# Patient Record
Sex: FEMALE | Race: WHITE | NOT HISPANIC OR LATINO | Employment: OTHER | URBAN - METROPOLITAN AREA
[De-identification: names, ages, dates, MRNs, and addresses within clinical notes are randomized per-mention and may not be internally consistent; named-entity substitution may affect disease eponyms.]

---

## 2020-01-15 ENCOUNTER — TELEPHONE (OUTPATIENT)
Dept: SCHEDULING | Facility: IMAGING CENTER | Age: 65
End: 2020-01-15

## 2020-01-15 DIAGNOSIS — N64.4 BREAST PAIN, RIGHT: ICD-10-CM

## 2020-01-22 ENCOUNTER — OFFICE VISIT (OUTPATIENT)
Dept: MEDICAL GROUP | Facility: PHYSICIAN GROUP | Age: 65
End: 2020-01-22
Payer: MEDICARE

## 2020-01-22 VITALS
BODY MASS INDEX: 35.84 KG/M2 | HEIGHT: 66 IN | OXYGEN SATURATION: 96 % | DIASTOLIC BLOOD PRESSURE: 80 MMHG | TEMPERATURE: 95.4 F | SYSTOLIC BLOOD PRESSURE: 126 MMHG | WEIGHT: 223 LBS | RESPIRATION RATE: 16 BRPM | HEART RATE: 98 BPM

## 2020-01-22 DIAGNOSIS — E78.2 MIXED HYPERLIPIDEMIA: ICD-10-CM

## 2020-01-22 DIAGNOSIS — R10.2 CHRONIC PELVIC PAIN IN FEMALE: ICD-10-CM

## 2020-01-22 DIAGNOSIS — N30.90 RECURRENT CYSTITIS: ICD-10-CM

## 2020-01-22 DIAGNOSIS — G89.29 CHRONIC PELVIC PAIN IN FEMALE: ICD-10-CM

## 2020-01-22 DIAGNOSIS — F43.10 PTSD (POST-TRAUMATIC STRESS DISORDER): ICD-10-CM

## 2020-01-22 PROBLEM — R10.31 CHRONIC GROIN PAIN, RIGHT: Status: ACTIVE | Noted: 2020-01-22

## 2020-01-22 PROCEDURE — 99204 OFFICE O/P NEW MOD 45 MIN: CPT | Performed by: NURSE PRACTITIONER

## 2020-01-22 RX ORDER — ALPRAZOLAM 0.5 MG/1
0.5 TABLET ORAL
COMMUNITY
Start: 2020-01-17 | End: 2020-02-16

## 2020-01-22 RX ORDER — BUSPIRONE HYDROCHLORIDE 15 MG/1
15 TABLET ORAL
COMMUNITY
Start: 2020-01-09 | End: 2020-04-08

## 2020-01-22 RX ORDER — BUSPIRONE HYDROCHLORIDE 30 MG/1
45 TABLET ORAL
COMMUNITY
Start: 2020-01-09 | End: 2020-04-08

## 2020-01-22 RX ORDER — GINSENG 100 MG
CAPSULE ORAL
COMMUNITY

## 2020-01-22 RX ORDER — TRAZODONE HYDROCHLORIDE 150 MG/1
150 TABLET ORAL
COMMUNITY
Start: 2020-01-02 | End: 2020-02-01

## 2020-01-22 RX ORDER — NITROGLYCERIN 0.4 MG/1
0.4 TABLET SUBLINGUAL
COMMUNITY
Start: 2019-09-17 | End: 2023-03-15

## 2020-01-22 RX ORDER — HYDROMORPHONE HYDROCHLORIDE 2 MG/1
2 TABLET ORAL
COMMUNITY
Start: 2015-09-05 | End: 2021-08-30

## 2020-01-22 RX ORDER — ACETAMINOPHEN 160 MG
TABLET,DISINTEGRATING ORAL
COMMUNITY
End: 2021-08-30

## 2020-01-22 RX ORDER — MULTIVITAMIN WITH IRON
250 TABLET ORAL
COMMUNITY
Start: 2015-02-25

## 2020-01-22 SDOH — HEALTH STABILITY: MENTAL HEALTH: HOW MANY STANDARD DRINKS CONTAINING ALCOHOL DO YOU HAVE ON A TYPICAL DAY?: 1 OR 2

## 2020-01-22 SDOH — HEALTH STABILITY: MENTAL HEALTH: HOW OFTEN DO YOU HAVE A DRINK CONTAINING ALCOHOL?: 2-3 TIMES A WEEK

## 2020-01-22 ASSESSMENT — ENCOUNTER SYMPTOMS
CHILLS: 0
SHORTNESS OF BREATH: 0
MYALGIAS: 1
NERVOUS/ANXIOUS: 1
ABDOMINAL PAIN: 0
FEVER: 0
DEPRESSION: 1
TINGLING: 1
BLOOD IN STOOL: 0
PALPITATIONS: 0

## 2020-01-22 ASSESSMENT — PATIENT HEALTH QUESTIONNAIRE - PHQ9: CLINICAL INTERPRETATION OF PHQ2 SCORE: 0

## 2020-01-22 NOTE — LETTER
[facility]  PARMINDER Albright  [address] [facility] [address]  [facility] [address]  Fax: [phone]   Authorization for Release/Disclosure of   Protected Health Information   Name: JULIANA BAILEY : 1955 SSN: [id number]   Address: [address] [place] [address] Phone:    [phone] (home)    I authorize the entity listed below to release/disclose the PHI below to:   [facility]/PARMINDER Albright and PARMINDER Albright   Provider or Entity Name:     Address   City, State, Zip   Phone:      Fax:     Reason for request: continuity of care   Information to be released:    [  ] LAST COLONOSCOPY,  including any PATH REPORT and follow-up  [  ] LAST FIT/COLOGUARD RESULT [  ] LAST DEXA  [  ] LAST MAMMOGRAM  [  ] LAST PAP  [  ] LAST LABS [  ] RETINA EXAM REPORT  [  ] IMMUNIZATION RECORDS  [  ] Release all info      [  ] Check here and initial the line next to each item to release ALL health information INCLUDING  _____ Care and treatment for drug and / or alcohol abuse  _____ HIV testing, infection status, or AIDS  _____ Genetic Testing    DATES OF SERVICE OR TIME PERIOD TO BE DISCLOSED: _____________  I understand and acknowledge that:  * This Authorization may be revoked at any time by you in writing, except if your health information has already been used or disclosed.  * Your health information that will be used or disclosed as a result of you signing this authorization could be re-disclosed by the recipient. If this occurs, your re-disclosed health information may no longer be protected by State or Federal laws.  * You may refuse to sign this Authorization. Your refusal will not affect your ability to obtain treatment.  * This Authorization becomes effective upon signing and will  on (date) __________.      If no date is indicated, this Authorization will  one (1) year from the signature date.    Name: Juliana Bailey    Signature:   Date:     2020            PLEASE FAX REQUESTED RECORDS BACK TO: [phone]
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
## 2020-01-22 NOTE — ASSESSMENT & PLAN NOTE
This is a chronic issue that began after insertion of mesh for urinary incontinence in 2005 and 2011. She did have mesh removed in 2015. Pain is located R groin area. She also experiences recurrent cystitis which are treated with chronic antibiotics. She has been treated with Dilaudid 2mg daily and flexeril prn which was prescribed by pain specialist.  She is also followed by [facility] specialist.

## 2020-01-22 NOTE — ASSESSMENT & PLAN NOTE
This is a chronic issue. She is taking atorvastatin 10mg daily. She is unsure when last lipid panel was drawn. She is a current smoker for 40+ year history of 0.5 ppd.
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
## 2020-01-22 NOTE — ASSESSMENT & PLAN NOTE
This has been an on-going issue for the past several years. Her symptoms have been treated with chronic antibiotic therapy. She was previously followed by urology, however hasn't established with specialist in area.

## 2020-01-29 ENCOUNTER — TELEPHONE (OUTPATIENT)
Dept: MEDICAL GROUP | Facility: PHYSICIAN GROUP | Age: 65
End: 2020-01-29

## 2020-01-29 DIAGNOSIS — D17.9 INTRAMUSCULAR LIPOMA: ICD-10-CM

## 2020-01-29 NOTE — TELEPHONE ENCOUNTER
The patient was seen by Dr. Lopez, records within [facility]. She was diagnosed with a arm mass. She is needing to see a surgeon for this. She declined the [facility] clinic suggestion.

## 2020-02-21 ENCOUNTER — TELEPHONE (OUTPATIENT)
Dept: MEDICAL GROUP | Facility: PHYSICIAN GROUP | Age: 65
End: 2020-02-21

## 2020-02-21 NOTE — TELEPHONE ENCOUNTER
Patient called in requiring about a urology referral sent. She hasn't heard anything from them. Per [facility], her referral has been received and the new patient coordinator will contact her to schedule. Patient was informed. Information for their clinic was given to her.

## 2020-02-25 ENCOUNTER — HOSPITAL ENCOUNTER (OUTPATIENT)
Facility: MEDICAL CENTER | Age: 65
End: 2020-02-25
Attending: NURSE PRACTITIONER
Payer: MEDICARE

## 2020-02-25 ENCOUNTER — OFFICE VISIT (OUTPATIENT)
Dept: MEDICAL GROUP | Facility: PHYSICIAN GROUP | Age: 65
End: 2020-02-25
Payer: MEDICARE

## 2020-02-25 VITALS
RESPIRATION RATE: 16 BRPM | DIASTOLIC BLOOD PRESSURE: 74 MMHG | HEIGHT: 66 IN | TEMPERATURE: 97.6 F | BODY MASS INDEX: 36.26 KG/M2 | WEIGHT: 225.6 LBS | SYSTOLIC BLOOD PRESSURE: 140 MMHG | OXYGEN SATURATION: 95 % | HEART RATE: 89 BPM

## 2020-02-25 DIAGNOSIS — N30.90 RECURRENT CYSTITIS: ICD-10-CM

## 2020-02-25 LAB
APPEARANCE UR: CLEAR
BILIRUB UR STRIP-MCNC: NORMAL MG/DL
COLOR UR AUTO: YELLOW
GLUCOSE UR STRIP.AUTO-MCNC: NORMAL MG/DL
KETONES UR STRIP.AUTO-MCNC: NORMAL MG/DL
LEUKOCYTE ESTERASE UR QL STRIP.AUTO: NORMAL
NITRITE UR QL STRIP.AUTO: NORMAL
PH UR STRIP.AUTO: 6 [PH] (ref 5–8)
PROT UR QL STRIP: NORMAL MG/DL
RBC UR QL AUTO: NORMAL
SP GR UR STRIP.AUTO: 1.02
UROBILINOGEN UR STRIP-MCNC: 0.2 MG/DL

## 2020-02-25 PROCEDURE — 81002 URINALYSIS NONAUTO W/O SCOPE: CPT | Performed by: NURSE PRACTITIONER

## 2020-02-25 PROCEDURE — 87086 URINE CULTURE/COLONY COUNT: CPT

## 2020-02-25 PROCEDURE — 99213 OFFICE O/P EST LOW 20 MIN: CPT | Performed by: NURSE PRACTITIONER

## 2020-02-25 RX ORDER — ALPRAZOLAM 0.5 MG/1
0.5 TABLET ORAL PRN
COMMUNITY
End: 2021-08-18

## 2020-02-25 RX ORDER — TRAZODONE HYDROCHLORIDE 50 MG/1
TABLET ORAL NIGHTLY PRN
Qty: 30 TAB | Refills: 0 | Status: CANCELLED | OUTPATIENT
Start: 2020-02-25

## 2020-02-25 RX ORDER — TRAZODONE HYDROCHLORIDE 150 MG/1
150 TABLET ORAL NIGHTLY PRN
Qty: 30 TAB | Refills: 0 | Status: SHIPPED | OUTPATIENT
Start: 2020-02-25 | End: 2021-08-30

## 2020-02-25 RX ORDER — TRAZODONE HYDROCHLORIDE 150 MG/1
150 TABLET ORAL
COMMUNITY
Start: 2020-01-23 | End: 2020-02-25 | Stop reason: SDUPTHER

## 2020-02-25 ASSESSMENT — ENCOUNTER SYMPTOMS
FEVER: 0
CHILLS: 0
FLANK PAIN: 0

## 2020-02-25 NOTE — LETTER
[facility]  PARMINDER Albright  [address] [facility] [address]  [facility] [address]  Fax: [phone]   Authorization for Release/Disclosure of   Protected Health Information   Name: JULIANA BAILEY : 1955 SSN: [id number]   Address: [address] [place] [address] Phone:    [phone] (home)    I authorize the entity listed below to release/disclose the PHI below to:   [facility]/PARMINDER Albright and PARMINDER Albright   Provider or Entity Name:  Primary Care   Address   City, State, [facility] Phone:      Fax:     Reason for request: continuity of care   Information to be released:    [  ] LAST COLONOSCOPY,  including any PATH REPORT and follow-up  [  ] LAST FIT/COLOGUARD RESULT [  ] LAST DEXA  [  ] LAST MAMMOGRAM  [  ] LAST PAP  [  ] LAST LABS [  ] RETINA EXAM REPORT  [  ] IMMUNIZATION RECORDS  [  ] Release all info      [  ] Check here and initial the line next to each item to release ALL health information INCLUDING  _____ Care and treatment for drug and / or alcohol abuse  _____ HIV testing, infection status, or AIDS  _____ Genetic Testing    DATES OF SERVICE OR TIME PERIOD TO BE DISCLOSED: _____________  I understand and acknowledge that:  * This Authorization may be revoked at any time by you in writing, except if your health information has already been used or disclosed.  * Your health information that will be used or disclosed as a result of you signing this authorization could be re-disclosed by the recipient. If this occurs, your re-disclosed health information may no longer be protected by State or Federal laws.  * You may refuse to sign this Authorization. Your refusal will not affect your ability to obtain treatment.  * This Authorization becomes effective upon signing and will  on (date) __________.      If no date is indicated, this Authorization will  one (1) year from the signature date.    Name: Juliana Bailey    Signature:   Date:          2/25/2020       PLEASE FAX REQUESTED RECORDS BACK TO: [phone]

## 2020-02-25 NOTE — PROGRESS NOTES
Subjective:   Veronica Reeves is a 65 y.o. female here today for concerns of UTI.     Dysuria    This is a new problem. Episode onset: 2 days ago. The problem occurs intermittently. The problem has been unchanged. The quality of the pain is described as burning. The pain is mild. There has been no fever. Associated symptoms include frequency and urgency. Pertinent negatives include no chills, flank pain, hematuria or hesitancy. She has tried increased fluids for the symptoms. Her past medical history is significant for recurrent UTIs.       Current medicines (including changes today)  Current Outpatient Medications   Medication Sig Dispense Refill   • TRAZODONE HCL PO Take  by mouth at bedtime as needed. Pt to let us know MG     • ALPRAZolam (XANAX) 0.5 MG Tab Take 0.5 mg by mouth as needed for Sleep.     • busPIRone (BUSPAR) 30 MG tablet Take 45 mg by mouth.     • busPIRone (BUSPAR) 15 MG tablet Take 15 mg by mouth.     • Cholecalciferol (VITAMIN D3) 2000 UNIT Cap by Other route.     • Cranberry 200 MG Cap Take  by mouth.     • HYDROmorphone (DILAUDID) 2 MG Tab Take 2 mg by mouth.     • Magnesium (ESSENTIAL MAGNESIUM) 250 MG Tab Take 250 mg by mouth.     • nitroglycerin (NITROSTAT) 0.4 MG SL Tab Place 0.4 mg under tongue.     • duloxetine (CYMBALTA) 60 MG CPEP delayed-release capsule Take 60 mg by mouth every day.     • atorvastatin (LIPITOR) 10 MG TABS Take 10 mg by mouth every evening.     • cyclobenzaprine (FLEXERIL) 10 MG TABS Take 10 mg by mouth 3 times a day as needed.       No current facility-administered medications for this visit.      She  has a past medical history of Cancer of breast (HCC), Cervical cancer (HCC), Chronic pelvic pain in female, Encounter for long-term (current) use of other medications, Migraine, Neck injury, RA (rheumatoid arthritis) (HCC), and Recurrent cystitis.    Social History     Socioeconomic History   • Marital status:      Spouse name: Not on file   • Number of children:  "Not on file   • Years of education: Not on file   • Highest education level: Not on file   Occupational History     Comment: Disabled   Social Needs   • Financial resource strain: Not on file   • Food insecurity     Worry: Not on file     Inability: Not on file   • Transportation needs     Medical: Not on file     Non-medical: Not on file   Tobacco Use   • Smoking status: Current Every Day Smoker     Packs/day: 0.50     Years: 40.00     Pack years: 20.00     Types: Cigarettes   • Smokeless tobacco: Never Used   • Tobacco comment: 4 cigarettes qd   Substance and Sexual Activity   • Alcohol use: Yes     Frequency: 2-3 times a week     Drinks per session: 1 or 2   • Drug use: Yes     Types: Marijuana, Inhaled     Comment: cbd cream   • Sexual activity: Not Currently   Lifestyle   • Physical activity     Days per week: Not on file     Minutes per session: Not on file   • Stress: Not on file   Relationships   • Social connections     Talks on phone: Not on file     Gets together: Not on file     Attends Christian service: Not on file     Active member of club or organization: Not on file     Attends meetings of clubs or organizations: Not on file     Relationship status: Not on file   • Intimate partner violence     Fear of current or ex partner: Not on file     Emotionally abused: Not on file     Physically abused: Not on file     Forced sexual activity: Not on file   Other Topics Concern   • Not on file   Social History Narrative   • Not on file       Review of Systems   Constitutional: Negative for chills and fever.   Genitourinary: Positive for dysuria, frequency and urgency. Negative for flank pain, hematuria and hesitancy.        Objective:     /74 (BP Location: Left arm, Patient Position: Sitting, BP Cuff Size: Adult)   Pulse 89   Temp 36.4 °C (97.6 °F) (Temporal)   Resp 16   Ht 1.676 m (5' 6\")   Wt 102.3 kg (225 lb 9.6 oz)   SpO2 95%  Body mass index is 36.41 kg/m².     Physical Exam:  Constitutional: " Oriented to person, place, and time and well-developed, well-nourished, and in no distress.   HENT:   Head: Normocephalic and atraumatic.   Eyes: Conjunctivae and EOM are normal. Pupils are equal, round, and reactive to light.   Neck: Normal range of motion. Neck supple.   Cardiovascular: Normal rate, regular rhythm, normal heart sounds.Exam reveals no friction rub. No murmur heard.  Pulmonary/Chest: Effort normal and breath sounds normal. No respiratory distress or use of accessory muscles. No wheezes, rhonchi, or rales.   Neurological: Alert and oriented to person, place, and time.   Psychiatric: Mood, memory, affect and judgment normal.       Assessment and Plan:   The following treatment plan was discussed    1. Recurrent cystitis  U/A normal. Urine sent for culture. Continue to maintain adequate daily water intake and practice good feminine hygiene practices.  - POCT Urinalysis  - URINE CULTURE(NEW); Future      Followup: Return if symptoms worsen or fail to improve.

## 2020-02-26 NOTE — TELEPHONE ENCOUNTER
Received request via: Patient    Was the patient seen in the last year in this department? Yes    Does the patient have an active prescription (recently filled or refills available) for medication(s) requested? yes

## 2020-02-28 LAB
BACTERIA UR CULT: NORMAL
SIGNIFICANT IND 70042: NORMAL
SITE SITE: NORMAL
SOURCE SOURCE: NORMAL

## 2020-03-11 ENCOUNTER — OFFICE VISIT (OUTPATIENT)
Dept: MEDICAL GROUP | Facility: PHYSICIAN GROUP | Age: 65
End: 2020-03-11
Payer: MEDICARE

## 2020-03-11 VITALS
HEART RATE: 84 BPM | TEMPERATURE: 98.5 F | BODY MASS INDEX: 36.16 KG/M2 | HEIGHT: 66 IN | RESPIRATION RATE: 16 BRPM | WEIGHT: 225 LBS | DIASTOLIC BLOOD PRESSURE: 80 MMHG | OXYGEN SATURATION: 97 % | SYSTOLIC BLOOD PRESSURE: 134 MMHG

## 2020-03-11 DIAGNOSIS — N64.4 BREAST PAIN, RIGHT: ICD-10-CM

## 2020-03-11 PROCEDURE — 99213 OFFICE O/P EST LOW 20 MIN: CPT | Performed by: PHYSICIAN ASSISTANT

## 2020-03-11 NOTE — PROGRESS NOTES
CC:  Chief Complaint   Patient presents with   • Arm Pain     Pain under right arm pit x 1 week        HISTORY OF PRESENT ILLNESS: Patient is a 65 y.o. female established patient presenting wit pain in her R axilla for the past days. It started itching in this region for the past several months. She has a hx of breast CA with biopsy in her R breast. She is not noticing any swelling or lumps in her axilla or breast but it is tender to touch. Pt had double mastectomy with breast reconstruction. She no longer gets regular mammogram screenings.       No problem-specific Assessment & Plan notes found for this encounter.      Patient Active Problem List    Diagnosis Date Noted   • Recurrent cystitis 01/22/2020   • Chronic pelvic pain in female 01/22/2020   • PTSD (post-traumatic stress disorder) 01/22/2020   • Mixed hyperlipidemia 01/22/2020      Allergies:Ciprofloxacin; Hydrocodone; and Percocet [oxycodone-acetaminophen]    Current Outpatient Medications   Medication Sig Dispense Refill   • TRAZODONE HCL PO Take  by mouth at bedtime as needed. Pt to let us know MG     • ALPRAZolam (XANAX) 0.5 MG Tab Take 0.5 mg by mouth as needed for Sleep.     • Cholecalciferol (VITAMIN D3) 2000 UNIT Cap by Other route.     • Cranberry 200 MG Cap Take  by mouth.     • HYDROmorphone (DILAUDID) 2 MG Tab Take 2 mg by mouth.     • Magnesium (ESSENTIAL MAGNESIUM) 250 MG Tab Take 250 mg by mouth.     • nitroglycerin (NITROSTAT) 0.4 MG SL Tab Place 0.4 mg under tongue.     • duloxetine (CYMBALTA) 60 MG CPEP delayed-release capsule Take 60 mg by mouth every day.     • atorvastatin (LIPITOR) 10 MG TABS Take 10 mg by mouth every evening.     • traZODone (DESYREL) 150 MG Tab Take 1 Tab by mouth at bedtime as needed for Sleep. 30 Tab 0   • busPIRone (BUSPAR) 30 MG tablet Take 45 mg by mouth.     • busPIRone (BUSPAR) 15 MG tablet Take 15 mg by mouth.     • cyclobenzaprine (FLEXERIL) 10 MG TABS Take 10 mg by mouth 3 times a day as needed.       No  "current facility-administered medications for this visit.        Social History     Tobacco Use   • Smoking status: Current Every Day Smoker     Packs/day: 0.50     Years: 40.00     Pack years: 20.00     Types: Cigarettes   • Smokeless tobacco: Never Used   • Tobacco comment: 4 cigarettes qd   Substance Use Topics   • Alcohol use: Yes     Frequency: 2-3 times a week     Drinks per session: 1 or 2   • Drug use: Yes     Types: Marijuana, Inhaled     Comment: cbd cream     Social History     Social History Narrative   • Not on file       Family History   Problem Relation Age of Onset   • No Known Problems Mother    • Stroke Father    • Autoimmune Disease Father         Crohn   • No Known Problems Sister    • No Known Problems Brother    • Mult Sclerosis Daughter    • Diabetes Daughter         ROS:     - Constitutional:  Negative for fever, chills, unexpected weight change, and fatigue/generalized weakness.    - HEENT:  Negative for headaches, vision changes, hearing changes, ear pain, ear discharge, rhinorrhea, sinus congestion, sore throat, and neck pain.    Breast: Positive for R breast pain and tenderness, negative for swelling, skin changes.     - Respiratory: Negative for cough, sputum production, chest congestion, dyspnea, wheezing, and crackles.      - Cardiovascular: Negative for chest pain, palpitations, orthopnea, and bilateral lower extremity edema.            - NOTE: All other systems reviewed and are negative, except as in HPI.      Exam:    /80 (BP Location: Left arm, Patient Position: Sitting, BP Cuff Size: Adult)   Pulse 84   Temp 36.9 °C (98.5 °F) (Temporal)   Resp 16   Ht 1.676 m (5' 6\")   Wt 102.1 kg (225 lb)   SpO2 97%  Body mass index is 36.32 kg/m².    General:  Well nourished, well developed female in NAD  Head is grossly normal.  Neck: Supple. Thyroid is not enlarged.  Pulmonary: Clear to ausculation and percussion.  Normal effort. No rales, ronchi, or wheezing.  Cardiovascular: " Regular rate and rhythm without murmur. Carotid and radial pulses are intact and equal bilaterally.  Breast: R breast has significant point tenderness in R upper outer breast. Positive for old surgical scars from mastectomy. No skin changes, no swelling or lumps. Chaperone present in room during exam.  Extremities: no clubbing, cyanosis, or edema.    Please note that this dictation was created using voice recognition software. I have made every reasonable attempt to correct obvious errors, but I expect that there are errors of grammar and possibly content that I did not discover before finalizing the note.      Assessment/Plan:    1. Breast pain, right  We will having Breast US done to characterize area of tenderness. Consider possible oncology referral in future if needed. She has a f/u appt scheduled with her PCP Desire Kemp in 2 weeks.   - US-SCREENING WHOLE BREAST (3D SCREENING); Future

## 2020-03-13 ENCOUNTER — HOSPITAL ENCOUNTER (OUTPATIENT)
Dept: RADIOLOGY | Facility: MEDICAL CENTER | Age: 65
End: 2020-03-13
Payer: MEDICARE

## 2020-03-13 NOTE — TELEPHONE ENCOUNTER
I spoke with pt that imaging is requiring her to have mammo with US done instead of just mammo. Likely a waste of time and resources but we have to do it this way.

## 2020-03-17 ENCOUNTER — HOSPITAL ENCOUNTER (OUTPATIENT)
Dept: RADIOLOGY | Facility: MEDICAL CENTER | Age: 65
End: 2020-03-17
Attending: PHYSICIAN ASSISTANT
Payer: MEDICARE

## 2020-03-17 DIAGNOSIS — N64.4 BREAST PAIN, RIGHT: ICD-10-CM

## 2020-03-17 PROCEDURE — 76642 ULTRASOUND BREAST LIMITED: CPT | Mod: RT

## 2020-03-17 PROCEDURE — G0279 TOMOSYNTHESIS, MAMMO: HCPCS

## 2020-03-20 RX ORDER — ATORVASTATIN CALCIUM 10 MG/1
10 TABLET, FILM COATED ORAL DAILY
Qty: 30 TAB | Refills: 1 | Status: SHIPPED
Start: 2020-03-20 | End: 2020-04-06

## 2020-03-22 ENCOUNTER — TELEPHONE (OUTPATIENT)
Dept: URGENT CARE | Facility: CLINIC | Age: 65
End: 2020-03-22

## 2020-03-22 NOTE — TELEPHONE ENCOUNTER
Called patient to reschedule appointment but her voicemail is not set up. If she calls back please reschedule.

## 2020-03-25 ENCOUNTER — TELEPHONE (OUTPATIENT)
Dept: MEDICAL GROUP | Facility: PHYSICIAN GROUP | Age: 65
End: 2020-03-25

## 2020-03-25 DIAGNOSIS — Z85.3 HISTORY OF BREAST CANCER: ICD-10-CM

## 2020-03-25 NOTE — TELEPHONE ENCOUNTER
1. Caller Name: Veronica Reeves     Call Back Number: [phone] (home)        How would the patient prefer to be contacted with a response: Phone call OK to leave a detailed message    2. SPECIFIC Action To Be Taken: wants referral to Oncology    3. Diagnosis/Clinical Reason for Request: Pain in the right breast; previous history of breast cancer    4. Specialty & Provider Name/Lab/Imaging Location: N/A    5. Is appointment scheduled for requested order/referral: no    Patient was not informed they will receive a return phone call from the office ONLY if there are any questions before processing their request. Advised to call back if they haven't received a call from the referral department in 5 days.

## 2020-03-30 ENCOUNTER — APPOINTMENT (OUTPATIENT)
Dept: MEDICAL GROUP | Facility: PHYSICIAN GROUP | Age: 65
End: 2020-03-30
Payer: MEDICARE

## 2020-03-31 ENCOUNTER — OFFICE VISIT (OUTPATIENT)
Dept: MEDICAL GROUP | Facility: PHYSICIAN GROUP | Age: 65
End: 2020-03-31
Payer: MEDICARE

## 2020-03-31 DIAGNOSIS — M79.10 MUSCLE PAIN: ICD-10-CM

## 2020-03-31 DIAGNOSIS — Z13.1 SCREENING FOR DIABETES MELLITUS: ICD-10-CM

## 2020-03-31 DIAGNOSIS — E78.2 MIXED HYPERLIPIDEMIA: ICD-10-CM

## 2020-03-31 DIAGNOSIS — R42 DIZZINESS: ICD-10-CM

## 2020-03-31 DIAGNOSIS — Z13.29 SCREENING FOR THYROID DISORDER: ICD-10-CM

## 2020-03-31 PROCEDURE — 99442 PR PHYSICIAN TELEPHONE EVALUATION 11-20 MIN: CPT | Performed by: NURSE PRACTITIONER

## 2020-03-31 NOTE — PROGRESS NOTES
"           Telephone Appointment Visit   As a means of avoiding spread of COVID-19, this visit is being conducted by telephone. This telephone visit was initiated by the patient and they verbally consented.    Time at start of call: 1:49pm    Reason for Call:  New Symptom/ Concern: Dizziness    Patient Comments / History:   Patient reports new onset of dizziness that began one week ago. Describes as feeling \"uneasy and lightheaded.\" Associated symptoms include fatigue. She denies heart palpitations, headaches, cough, or SOB. She denies syncope or LOC. She tried taking OTC Dramamine which did not relief her symptoms. She does have hx of hyperlipidemia. Reports drinking at least 2 L of water daily. Her last BP reading was 138/80 on 3/11/20. Her last labs were drawn over one year ago. She also notes musculoskeletal pain located substernal region. Her symptoms were evaluated by pain specialist one week ago who suggested that she perform daily stretching to pectoral region; she reports no relief.     Labs / Images Reviewed   No current labs to review. She did have U/S of breast performed on 3/17/20 which was normal.    Assessment and Plan:     1. Dizziness  Labs ordered to r/o underlying etiology. Advised that she continue to monitor symptoms and notify clinic if symptoms worsen. Labs faxed to [facility].  - TSH; Future  - FREE THYROXINE; Future  - HEMOGLOBIN A1C; Future  - Comp Metabolic Panel; Future  - CBC WITH DIFFERENTIAL; Future  - Lipid Profile; Future    2. Muscle pain  I suspect symptoms are musculoskeletal in nature. Encouraged to continue daily stretches and application of ice/heat. Notify clinic if symptoms worsen or do not improve.      3. Mixed hyperlipidemia  - Comp Metabolic Panel; Future  - CBC WITH DIFFERENTIAL; Future  - Lipid Profile; Future    4. Screening for thyroid disorder  - TSH; Future  - FREE THYROXINE; Future    5. Screening for diabetes mellitus  - HEMOGLOBIN A1C; " Future    Follow-up: Return if symptoms worsen or fail to improve.    Time at end of call: 2:01pm  Total Time Spent: 11 min    PARMINDER Albright

## 2020-04-02 ENCOUNTER — TELEPHONE (OUTPATIENT)
Dept: URGENT CARE | Facility: CLINIC | Age: 65
End: 2020-04-02

## 2020-04-02 PROBLEM — E66.9 OBESITY: Status: ACTIVE | Noted: 2020-04-02

## 2020-04-02 LAB — HBA1C MFR BLD: 6 % (ref 0–5.6)

## 2020-04-02 NOTE — TELEPHONE ENCOUNTER
VOICEMAIL:    A1C dx codes did not pass medicare edits. Please call [facility] with new dx codes.      [phone]

## 2020-04-06 DIAGNOSIS — E78.2 MIXED HYPERLIPIDEMIA: ICD-10-CM

## 2020-04-06 RX ORDER — ATORVASTATIN CALCIUM 20 MG/1
20 TABLET, FILM COATED ORAL DAILY
Qty: 90 TAB | Refills: 0 | Status: SHIPPED | OUTPATIENT
Start: 2020-04-06 | End: 2023-03-15

## 2020-04-07 ENCOUNTER — OFFICE VISIT (OUTPATIENT)
Dept: MEDICAL GROUP | Facility: PHYSICIAN GROUP | Age: 65
End: 2020-04-07
Payer: MEDICARE

## 2020-04-07 DIAGNOSIS — R42 DIZZINESS: ICD-10-CM

## 2020-04-07 DIAGNOSIS — R73.03 PREDIABETES: ICD-10-CM

## 2020-04-07 DIAGNOSIS — F17.210 CIGARETTE NICOTINE DEPENDENCE WITHOUT COMPLICATION: ICD-10-CM

## 2020-04-07 DIAGNOSIS — E78.2 MIXED HYPERLIPIDEMIA: ICD-10-CM

## 2020-04-07 PROBLEM — M79.10 MUSCLE PAIN: Status: RESOLVED | Noted: 2020-03-31 | Resolved: 2020-04-07

## 2020-04-07 PROCEDURE — 99443 PR PHYSICIAN TELEPHONE EVALUATION 21-30 MIN: CPT | Performed by: NURSE PRACTITIONER

## 2020-04-07 NOTE — PROGRESS NOTES
Telephone Appointment Visit   As a means of avoiding spread of COVID-19, this visit is being conducted by telephone. This telephone visit was initiated by the patient and they verbally consented.    Time at start of call: 1020am    Reason for Call: Discuss lab work and dizziness    Patient Comments / History:   Reviewed labs drawn on 4/2/20 with patient. Patient is continuing to experience intermittent dizziness that began over one month ago. Associated symptoms include fatigue. She has been monitoring BP at home which has been running 120-140s/60-90s. She reports chronic SOB. She denies heart palpitations, chest pain, headache, or syncope. She does have history of hyperlipidemia which is currently managed with atorvastatin. She denies prior hx of HTN. She also has hx of DM II. She is a current tobacco smoker of 1/2 ppd for 40 year history.    Labs / Images Reviewed   Labs drawn on 4/2/20 show:     Triglycerides 196  HDL 32  HA1C 6.0    CMP, CBC, TSH WNL.    Assessment and Plan:     1. Dizziness  Based on her extensive smoking history, hyperlipidemia, and fluctuating BP- I have referred her to cardiology for cardiac work up, including EKG, carotid doppler, and echo. Will also consider her referral to pulmonology and neurology based on cardiac findings.  - REFERRAL TO CARDIOLOGY    2. Mixed hyperlipidemia  Uncontrolled. Increase atorvastatin to 20mg daily. Encourage diet high in vegetables, fruits, whole grains, and low in saturated fats. Encourage smoking cessation.  - REFERRAL TO CARDIOLOGY    3. Prediabetes  Encourage diet rich in vegetables, fruits, whole grains, and low in saturated fats and simple carbohydrates. I also encourage regular physical activity.    4. Cigarette nicotine dependence without complication  - REFERRAL TO CARDIOLOGY      Follow-up: Return in about 3 months (around 7/7/2020) for For follow-up on prediabetes, hyperlipidemia.    Time at end of call: 1044am  Total Time Spent: 21-30  PARMINDER Hayden

## 2020-04-09 ENCOUNTER — PATIENT MESSAGE (OUTPATIENT)
Dept: MEDICAL GROUP | Facility: PHYSICIAN GROUP | Age: 65
End: 2020-04-09

## 2020-04-09 DIAGNOSIS — R11.2 INTRACTABLE VOMITING WITH NAUSEA, UNSPECIFIED VOMITING TYPE: ICD-10-CM

## 2020-04-09 NOTE — TELEPHONE ENCOUNTER
From: Veronica Reeves  To: PARMINDER Albright  Sent: 4/9/2020 7:58 AM PDT  Subject: Non-Urgent Medical Question    Having been prescribed far too many antibiotics in the last 5 years, I would like to have a more in depth liver function test(ing). Would that be blood work or?    May I get a copy of the labs I just had, with my levels...etc    Would you recommend I see a Gastro?    Thank you, I have to get handle on my health. Being dizzy and nauseous everyday really limits my ability to function each day.     ...Veronica

## 2020-04-21 ENCOUNTER — TELEPHONE (OUTPATIENT)
Dept: CARDIOLOGY | Facility: PHYSICIAN GROUP | Age: 65
End: 2020-04-21

## 2020-05-14 ENCOUNTER — OFFICE VISIT (OUTPATIENT)
Dept: MEDICAL GROUP | Facility: PHYSICIAN GROUP | Age: 65
End: 2020-05-14
Payer: MEDICARE

## 2020-05-14 VITALS
DIASTOLIC BLOOD PRESSURE: 84 MMHG | WEIGHT: 224 LBS | RESPIRATION RATE: 16 BRPM | OXYGEN SATURATION: 96 % | TEMPERATURE: 97.2 F | HEART RATE: 85 BPM | HEIGHT: 66 IN | SYSTOLIC BLOOD PRESSURE: 150 MMHG | BODY MASS INDEX: 36 KG/M2

## 2020-05-14 DIAGNOSIS — T78.40XD ALLERGIC REACTION, SUBSEQUENT ENCOUNTER: ICD-10-CM

## 2020-05-14 DIAGNOSIS — R42 DIZZINESS: ICD-10-CM

## 2020-05-14 DIAGNOSIS — K21.9 GASTROESOPHAGEAL REFLUX DISEASE WITHOUT ESOPHAGITIS: ICD-10-CM

## 2020-05-14 DIAGNOSIS — B07.0 PLANTAR WART: ICD-10-CM

## 2020-05-14 PROBLEM — Z09 HOSPITAL DISCHARGE FOLLOW-UP: Status: ACTIVE | Noted: 2020-05-14

## 2020-05-14 PROCEDURE — 99214 OFFICE O/P EST MOD 30 MIN: CPT | Performed by: NURSE PRACTITIONER

## 2020-05-14 RX ORDER — FAMOTIDINE 20 MG/1
20 TABLET, FILM COATED ORAL 2 TIMES DAILY
COMMUNITY
End: 2021-08-30

## 2020-05-14 RX ORDER — AMLODIPINE BESYLATE 5 MG/1
5 TABLET ORAL DAILY
COMMUNITY
End: 2021-08-30

## 2020-05-14 RX ORDER — CETIRIZINE HYDROCHLORIDE 10 MG/1
10 TABLET ORAL DAILY
COMMUNITY
End: 2021-08-30

## 2020-05-14 ASSESSMENT — ENCOUNTER SYMPTOMS
CHILLS: 0
SHORTNESS OF BREATH: 0
FEVER: 0
HEADACHES: 0
DIZZINESS: 0
PALPITATIONS: 0

## 2020-05-14 NOTE — ASSESSMENT & PLAN NOTE
She reports skin lesion located R pointer finger that has been present for the past several months. She denies changes to lesion. No redness, tenderness, or drainage noted.

## 2020-05-14 NOTE — ASSESSMENT & PLAN NOTE
Patient reports that she was admitted to [facility] on 5/7/20 for generalized itchiness, abdominal cramping, and dizziness. Her BP was noted to be 200s upon admission. She reports COVID testing negative. EKG NSR. Echo showed LVEF of 69% with normal systolic function. Reports that she was dx with anaphylactic reaction, GERD, and HTN. She was d/c home with a prescription for amlodipine, cetirizine, and famotidine, along with EPI pen.     She does report symptom improvement. She admits that she didn't get EPI pen due to cost. She does have appointment scheduled with cardiology this Friday.

## 2020-05-14 NOTE — PROGRESS NOTES
Subjective:   Veronica Reeves is a 65 y.o. female here today for hospital follow up for allergic reaction, abdominal pain, and dizziness.    Hospital discharge follow-up  Patient reports that she was admitted to [facility] on 5/7/20 for generalized itchiness, abdominal cramping, and dizziness. Her BP was noted to be 200s upon admission. She reports COVID testing negative. EKG NSR. Echo showed LVEF of 69% with normal systolic function. Reports that she was dx with anaphylactic reaction, GERD, and HTN. She was d/c home with a prescription for amlodipine, cetirizine, and famotidine, along with EPI pen.     She does report symptom improvement. She admits that she didn't get EPI pen due to cost. She does have appointment scheduled with cardiology this Friday.     Plantar wart  She reports skin lesion located R pointer finger that has been present for the past several months. She denies changes to lesion. No redness, tenderness, or drainage noted.     Current medicines (including changes today)  Current Outpatient Medications   Medication Sig Dispense Refill   • amLODIPine (NORVASC) 5 MG Tab Take 5 mg by mouth every day.     • famotidine (PEPCID) 20 MG Tab Take 20 mg by mouth 2 times a day.     • cetirizine (ZYRTEC) 10 MG Tab Take 10 mg by mouth every day.     • atorvastatin (LIPITOR) 20 MG Tab Take 1 Tab by mouth every day. 90 Tab 0   • ALPRAZolam (XANAX) 0.5 MG Tab Take 0.5 mg by mouth as needed for Sleep.     • traZODone (DESYREL) 150 MG Tab Take 1 Tab by mouth at bedtime as needed for Sleep. 30 Tab 0   • Cholecalciferol (VITAMIN D3) 2000 UNIT Cap by Other route.     • Cranberry 200 MG Cap Take  by mouth.     • HYDROmorphone (DILAUDID) 2 MG Tab Take 2 mg by mouth.     • Magnesium (ESSENTIAL MAGNESIUM) 250 MG Tab Take 250 mg by mouth.     • nitroglycerin (NITROSTAT) 0.4 MG SL Tab Place 0.4 mg under tongue.     • duloxetine (CYMBALTA) 60 MG CPEP delayed-release capsule Take 60 mg by mouth 2 times a day.        No current facility-administered medications for this visit.      She  has a past medical history of Cancer of breast (HCC), Cervical cancer (HCC), Chronic pelvic pain in female, Encounter for long-term (current) use of other medications, Migraine, Neck injury, RA (rheumatoid arthritis) ([facility]), and Recurrent cystitis.    Social History     Socioeconomic History   • Marital status:      Spouse name: Not on file   • Number of children: Not on file   • Years of education: Not on file   • Highest education level: Not on file   Occupational History     Comment: Disabled   Social Needs   • Financial resource strain: Not on file   • Food insecurity     Worry: Not on file     Inability: Not on file   • Transportation needs     Medical: Not on file     Non-medical: Not on file   Tobacco Use   • Smoking status: Current Every Day Smoker     Packs/day: 0.50     Years: 40.00     Pack years: 20.00     Types: Cigarettes   • Smokeless tobacco: Never Used   • Tobacco comment: 4 cigarettes qd   Substance and Sexual Activity   • Alcohol use: Yes     Frequency: 2-3 times a week     Drinks per session: 1 or 2   • Drug use: Yes     Types: Marijuana, Inhaled     Comment: cbd cream   • Sexual activity: Not Currently   Lifestyle   • Physical activity     Days per week: Not on file     Minutes per session: Not on file   • Stress: Not on file   Relationships   • Social connections     Talks on phone: Not on file     Gets together: Not on file     Attends Taoism service: Not on file     Active member of club or organization: Not on file     Attends meetings of clubs or organizations: Not on file     Relationship status: Not on file   • Intimate partner violence     Fear of current or ex partner: Not on file     Emotionally abused: Not on file     Physically abused: Not on file     Forced sexual activity: Not on file   Other Topics Concern   • Not on file   Social History Narrative   • Not on file       Review of Systems  "  Constitutional: Negative for chills, fever and malaise/fatigue.   Respiratory: Negative for shortness of breath.    Cardiovascular: Negative for chest pain and palpitations.   Skin: Negative for itching and rash.   Neurological: Negative for dizziness and headaches.        Objective:     /84 (BP Location: Left arm, Patient Position: Sitting, BP Cuff Size: Adult long)   Pulse 85   Temp 36.2 °C (97.2 °F) (Temporal)   Resp 16   Ht 1.676 m (5' 6\")   Wt 101.6 kg (224 lb)   SpO2 96%  Body mass index is 36.15 kg/m².     Physical Exam:  Constitutional: Oriented to person, place, and time and well-developed, well-nourished, and in no distress.   HENT:   Head: Normocephalic and atraumatic.   Eyes: Conjunctivae and EOM are normal. Pupils are equal, round, and reactive to light.   Neck: Normal range of motion. Neck supple.   Cardiovascular: Normal rate, regular rhythm, normal heart sounds. Exam reveals no friction rub. No murmur heard.  Pulmonary/Chest: Effort normal and breath sounds normal. No respiratory distress or use of accessory muscles. No wheezes, rhonchi, or rales.   Musculoskeletal: Full range of motion. No deformity or swelling of joints.   Neurological: Alert and oriented to person, place, and time. Gait normal.  Skin: Skin is warm and dry. No cyanosis. No edema. Verruca located R pointer finger.  Psychiatric: Mood, memory, affect and judgment normal.     Assessment and Plan:   The following treatment plan was discussed    1. Dizziness  Records requested from [facility], however unable to obtain discharge summary or progress note. She has appointment scheduled with cardiology this Friday. Advised that she continue to take amlodipine as prescribed.    2. Allergic reaction, subsequent encounter  Stable, resolved.     3. Plantar wart  She was referred to dermatology for removal.  - REFERRAL TO DERMATOLOGY    4. Gastroesophageal reflux disease without esophagitis  Stable, improved. Continue famotidine " as prescribed.      Followup: Return in about 1 month (around 6/14/2020) for Establish care.

## 2020-06-08 ENCOUNTER — TELEPHONE (OUTPATIENT)
Dept: HEALTH INFORMATION MANAGEMENT | Facility: OTHER | Age: 65
End: 2020-06-08

## 2020-06-08 NOTE — TELEPHONE ENCOUNTER
1. Caller Name: Veronica                 Call Back Number: [phone]  [facility] PCP or Specialty Provider: Yes Peter        2.  In the last two weeks, has the patient had any new or worsening symptoms (not explained by alternative diagnosis)? Yes, the patient reports the following COVID-19 consistent symptoms: headache, nausea and vomiting.    3.  Does patient have any comoribidities? Immunosuppressed    4.  Has the patient traveled in the last 14 days OR had any known contact with someone who is suspected or confirmed to have COVID-19?  No.    5. Disposition: Deferred to [facility] Provider     Patient had pain next to right breast.  She has seen oncology for PET scan with 3 suspicious areas.  Brain MRI this morning at [facility] in [place].    Note routed to [facility] Provider: Provider action needed: Patient would like pertinent labs ordered due to nausea and vomiting, dizziness and headache x 3 weeks.  Patient stated she had MRI today.

## 2020-06-09 ENCOUNTER — OFFICE VISIT (OUTPATIENT)
Dept: MEDICAL GROUP | Facility: PHYSICIAN GROUP | Age: 65
End: 2020-06-09
Payer: MEDICARE

## 2020-06-09 VITALS
OXYGEN SATURATION: 95 % | BODY MASS INDEX: 36.64 KG/M2 | DIASTOLIC BLOOD PRESSURE: 85 MMHG | HEIGHT: 66 IN | HEART RATE: 120 BPM | TEMPERATURE: 97.3 F | WEIGHT: 228 LBS | RESPIRATION RATE: 20 BRPM | SYSTOLIC BLOOD PRESSURE: 140 MMHG

## 2020-06-09 DIAGNOSIS — N63.0 BREAST NODULE: ICD-10-CM

## 2020-06-09 DIAGNOSIS — R42 LIGHTHEADEDNESS: ICD-10-CM

## 2020-06-09 PROCEDURE — 99214 OFFICE O/P EST MOD 30 MIN: CPT | Performed by: PHYSICIAN ASSISTANT

## 2020-06-09 NOTE — PROGRESS NOTES
CC:  Chief Complaint   Patient presents with   • Establish Care   • Other     Dizziness, nausea and headache x weeks        HISTORY OF PRESENT ILLNESS: Patient is a 65 y.o. female established patient presenting with intermittent lightheadedness and nausea. Episodes of dizziness can last up to 2-3 hours. Not having any improvement. In the past 5-6 years she was dx with sjogrens, EBV, scleroderma by [facility] and [facility] rheumatologist. She was referred to cardiologist but first appt is end of July.  She is not taking any BP medication. She admits to having diarrhea in recent weeks. Once or twice per week she will have multiple episodes of diarrhea during the day.   Had recent brain MRI that was normal. PET scan showed 3 suspicious nodules in her R breast with biopsy planned on June 19.     Seen in [facility] last month with normal labs. EKG in [facility] according to Desire Almeida's last visit.   Denies CP, palpitations, SOB, polyuria, blood in stool.     No problem-specific Assessment & Plan notes found for this encounter.      Patient Active Problem List    Diagnosis Date Noted   • Hospital discharge follow-up 05/14/2020   • Dizziness 05/14/2020   • Plantar wart 05/14/2020   • Obesity 04/02/2020   • Recurrent cystitis 01/22/2020   • Chronic pelvic pain in female 01/22/2020   • PTSD (post-traumatic stress disorder) 01/22/2020   • Mixed hyperlipidemia 01/22/2020      Allergies:Ciprofloxacin; Hydrocodone; Bee; and Percocet [oxycodone-acetaminophen]    Current Outpatient Medications   Medication Sig Dispense Refill   • amLODIPine (NORVASC) 5 MG Tab Take 5 mg by mouth every day.     • famotidine (PEPCID) 20 MG Tab Take 20 mg by mouth 2 times a day.     • cetirizine (ZYRTEC) 10 MG Tab Take 10 mg by mouth every day.     • atorvastatin (LIPITOR) 20 MG Tab Take 1 Tab by mouth every day. 90 Tab 0   • ALPRAZolam (XANAX) 0.5 MG Tab Take 0.5 mg by mouth as needed for Sleep.     • traZODone (DESYREL) 150 MG Tab Take 1 Tab by  mouth at bedtime as needed for Sleep. 30 Tab 0   • Cholecalciferol (VITAMIN D3) 2000 UNIT Cap by Other route.     • Cranberry 200 MG Cap Take  by mouth.     • HYDROmorphone (DILAUDID) 2 MG Tab Take 2 mg by mouth.     • Magnesium (ESSENTIAL MAGNESIUM) 250 MG Tab Take 250 mg by mouth.     • nitroglycerin (NITROSTAT) 0.4 MG SL Tab Place 0.4 mg under tongue.     • duloxetine (CYMBALTA) 60 MG CPEP delayed-release capsule Take 60 mg by mouth 2 times a day.       No current facility-administered medications for this visit.        Social History     Tobacco Use   • Smoking status: Current Every Day Smoker     Packs/day: 0.50     Years: 40.00     Pack years: 20.00     Types: Cigarettes   • Smokeless tobacco: Never Used   • Tobacco comment: 4 cigarettes qd   Substance Use Topics   • Alcohol use: Yes     Frequency: 2-3 times a week     Drinks per session: 1 or 2   • Drug use: Yes     Types: Marijuana, Inhaled     Comment: cbd cream     Social History     Social History Narrative   • Not on file       Family History   Problem Relation Age of Onset   • No Known Problems Mother    • Stroke Father    • Autoimmune Disease Father         Crohn   • No Known Problems Sister    • No Known Problems Brother    • Mult Sclerosis Daughter    • Diabetes Daughter         ROS:     - Constitutional:  Negative for fever, chills, unexpected weight change, and fatigue/generalized weakness.    - HEENT:  Negative for headaches, vision changes, hearing changes, ear pain, ear discharge, rhinorrhea, sinus congestion, sore throat, and neck pain.      - Respiratory: Negative for cough, sputum production, chest congestion, dyspnea, wheezing, and crackles.      - Cardiovascular:Positive for lightheadedness. Negative for chest pain, palpitations, orthopnea, and bilateral lower extremity edema.     - Gastrointestinal:Positive for intermittent nonbloody diarrhea and nausea/vomiting. Negative for heartburn, abdominal pain, hematochezia, melena, constipation,  "and greasy/foul-smelling stools.     - Genitourinary: Negative for dysuria, polyuria, hematuria, pyuria, urinary urgency, and urinary incontinence.             - NOTE: All other systems reviewed and are negative, except as in HPI.      Exam:    /85 (BP Location: Left arm, Patient Position: Sitting, BP Cuff Size: Adult long)   Pulse (!) 120   Temp 36.3 °C (97.3 °F)   Resp 20   Ht 1.676 m (5' 6\")   Wt 103.4 kg (228 lb)   SpO2 95%  Body mass index is 36.8 kg/m².    General:  Well nourished, well developed female in NAD  Head is grossly normal.  Neck: Supple. Thyroid is not enlarged.  Pulmonary: Clear to auscultation. No ronchi, wheezing or rales  Cardiac: Regular rate and rhythm. No murmurs.  Skin: Warm and dry. No obvious lesions  Neuro: Normal muscle tone. Gait normal. Alert and oriented.  Psych: Normal mood and affect      Please note that this dictation was created using voice recognition software. I have made every reasonable attempt to correct obvious errors, but I expect that there are errors of grammar and possibly content that I did not discover before finalizing the note.    LABS: 6/9/2020: Results reviewed and discussed with the patient, questions answered.    Assessment/Plan:    1. Lightheadedness  I instructed her to take her BP when she is feeling lightheaded. She is then to lay supine and lightheadedness should resolve. When she is having diarrhea she needs to aggressively hydrate herself and take electrolytes. I want her to do this for the next week. If sxs are still persisting, she should call and make appt.     2. Breast nodule  F/u with oncologist for this.          "

## 2020-06-12 ENCOUNTER — TELEPHONE (OUTPATIENT)
Dept: MEDICAL GROUP | Facility: PHYSICIAN GROUP | Age: 65
End: 2020-06-12

## 2020-06-12 NOTE — TELEPHONE ENCOUNTER
Spoke with Patient regarding her misplacing her drivers license. Patient was seen 6/9/20. License and insurance cards were not collected per conversations with PAR and Door screener working that day. It was explained to patient when we called her on Thursday that we do not have her licence after rounding with all staff in clinic. Patient is still upset and wants us to produce her license although we do not have it in the clinic.

## 2020-07-29 ENCOUNTER — APPOINTMENT (OUTPATIENT)
Dept: CARDIOLOGY | Facility: PHYSICIAN GROUP | Age: 65
End: 2020-07-29
Payer: MEDICARE

## 2020-08-19 PROBLEM — C83.11 MANTLE CELL LYMPHOMA OF LYMPH NODES OF NECK (HCC): Status: ACTIVE | Noted: 2020-08-19

## 2020-12-14 ENCOUNTER — HOSPITAL ENCOUNTER (OUTPATIENT)
Dept: LAB | Facility: MEDICAL CENTER | Age: 65
End: 2020-12-14
Attending: PHYSICIAN ASSISTANT
Payer: MEDICARE

## 2020-12-14 LAB
COVID ORDER STATUS COVID19: NORMAL
SARS-COV-2 RNA RESP QL NAA+PROBE: NOTDETECTED
SPECIMEN SOURCE: NORMAL

## 2020-12-14 PROCEDURE — C9803 HOPD COVID-19 SPEC COLLECT: HCPCS

## 2020-12-14 PROCEDURE — U0003 INFECTIOUS AGENT DETECTION BY NUCLEIC ACID (DNA OR RNA); SEVERE ACUTE RESPIRATORY SYNDROME CORONAVIRUS 2 (SARS-COV-2) (CORONAVIRUS DISEASE [COVID-19]), AMPLIFIED PROBE TECHNIQUE, MAKING USE OF HIGH THROUGHPUT TECHNOLOGIES AS DESCRIBED BY CMS-2020-01-R: HCPCS

## 2021-08-09 PROBLEM — M65.352 TRIGGER FINGER, LEFT LITTLE FINGER: Status: ACTIVE | Noted: 2021-08-09

## 2021-08-11 NOTE — PREADMISSION SCREENING NOTE
REC'D AS RN TO REVIEW    AUTOIMMUNE DISEASE - LYMES DISEASE  THYROID  CANCER - BREAST 2020   BILATERAL      CERVICAL CANCER ?  HTN ?  NIDDM ?    8/11/21 CHART REVIEWED.  LVM - TO REVIEW HX       LABS - 6/11/21 CO2 18 ANION GAP 15 GLUCOSE 116 GLOBULIN 2.4  EKG -  MAY NEED IF HTN AND/OR DIABETES

## 2021-08-17 NOTE — PROGRESS NOTES
Ms. Sherice Reeves called since her last visit she is now also having left ring triggering and requests this be added to her procedure.  We will therefore plan for a left ring and small trigger releases.

## 2021-08-18 RX ORDER — CELERY SEED OIL
OIL (ML) MISCELLANEOUS
COMMUNITY

## 2021-08-18 NOTE — PROGRESS NOTES
Not a ROSC candidate for this procedure with anesthesia as she has SOB with minimal exertion (<4 mets) and has not had this evaluated.

## 2021-08-18 NOTE — PREADMISSION SCREENING NOTE
8/18/21 SPOKE VIA PHONE.  HX REVIEWED AND MED REC UPDATED PER PT LIST.      PT STATES SHE GETS SOB FREQUENTLY - WITH EXERTION AND WALKING, SHORT OR QUICK  WALKS - DOES NOT REGULARLY USE INHALER - DOES NOT CARRY WITH HER.   ALBUTEROL WAS PRESCRIBED A FEW YRS AGO. HAS NOT DISCUSSED SOB WITH PCP.  ADVISED TO SEEK ATTENTION OF PCP FOR FOLLOW UP. STATES SHE HAS FREQUENT PNEUMONIA BUT DENIES HAVING IN THE LAST 2 YRS.      AUTOIMMUNE DISEASE - LYMES DISEASE - DX 2013  NO SYMPTOMS OR MEDS  THYROID  CANCER - RT BREAST 2007 BILATERAL MASTECTOMY    **NOTHING RIGHT SIDE   MANTEL CELL LYMPHOMA - DX 2020 [place]       CHEMO/RADIATION  HTN - PT DENIES  NIDDM - PT DENIES             LABS - 6/11/21 CO2 18 ANION GAP 15 GLUCOSE 116 GLOBULIN 2.4

## 2021-08-30 ENCOUNTER — PRE-ADMISSION TESTING (OUTPATIENT)
Dept: ADMISSIONS | Facility: MEDICAL CENTER | Age: 66
End: 2021-08-30
Attending: SURGERY
Payer: MEDICARE

## 2021-09-01 ENCOUNTER — HOSPITAL ENCOUNTER (OUTPATIENT)
Facility: MEDICAL CENTER | Age: 66
End: 2021-09-01
Attending: SURGERY | Admitting: SURGERY
Payer: MEDICARE

## 2021-09-01 ENCOUNTER — ANESTHESIA EVENT (OUTPATIENT)
Dept: SURGERY | Facility: MEDICAL CENTER | Age: 66
End: 2021-09-01
Payer: MEDICARE

## 2021-09-01 ENCOUNTER — ANESTHESIA (OUTPATIENT)
Dept: SURGERY | Facility: MEDICAL CENTER | Age: 66
End: 2021-09-01
Payer: MEDICARE

## 2021-09-01 VITALS
DIASTOLIC BLOOD PRESSURE: 74 MMHG | WEIGHT: 218.26 LBS | TEMPERATURE: 98.1 F | SYSTOLIC BLOOD PRESSURE: 147 MMHG | BODY MASS INDEX: 35.08 KG/M2 | OXYGEN SATURATION: 93 % | HEIGHT: 66 IN | HEART RATE: 85 BPM | RESPIRATION RATE: 18 BRPM

## 2021-09-01 DIAGNOSIS — M65.352 TRIGGER FINGER, LEFT LITTLE FINGER: ICD-10-CM

## 2021-09-01 PROCEDURE — 160009 HCHG ANES TIME/MIN: Performed by: SURGERY

## 2021-09-01 PROCEDURE — 160002 HCHG RECOVERY MINUTES (STAT): Performed by: SURGERY

## 2021-09-01 PROCEDURE — A9270 NON-COVERED ITEM OR SERVICE: HCPCS | Performed by: ANESTHESIOLOGY

## 2021-09-01 PROCEDURE — 160039 HCHG SURGERY MINUTES - EA ADDL 1 MIN LEVEL 3: Performed by: SURGERY

## 2021-09-01 PROCEDURE — 160035 HCHG PACU - 1ST 60 MINS PHASE I: Performed by: SURGERY

## 2021-09-01 PROCEDURE — 160025 RECOVERY II MINUTES (STATS): Performed by: SURGERY

## 2021-09-01 PROCEDURE — 160046 HCHG PACU - 1ST 60 MINS PHASE II: Performed by: SURGERY

## 2021-09-01 PROCEDURE — 160028 HCHG SURGERY MINUTES - 1ST 30 MINS LEVEL 3: Performed by: SURGERY

## 2021-09-01 PROCEDURE — 700105 HCHG RX REV CODE 258: Performed by: ANESTHESIOLOGY

## 2021-09-01 PROCEDURE — 700111 HCHG RX REV CODE 636 W/ 250 OVERRIDE (IP): Performed by: ANESTHESIOLOGY

## 2021-09-01 PROCEDURE — 26055 INCISE FINGER TENDON SHEATH: CPT | Performed by: SURGERY

## 2021-09-01 PROCEDURE — 700101 HCHG RX REV CODE 250: Performed by: SURGERY

## 2021-09-01 PROCEDURE — 501838 HCHG SUTURE GENERAL: Performed by: SURGERY

## 2021-09-01 PROCEDURE — 700102 HCHG RX REV CODE 250 W/ 637 OVERRIDE(OP): Performed by: ANESTHESIOLOGY

## 2021-09-01 PROCEDURE — 160048 HCHG OR STATISTICAL LEVEL 1-5: Performed by: SURGERY

## 2021-09-01 RX ORDER — OXYCODONE HCL 5 MG/5 ML
10 SOLUTION, ORAL ORAL
Status: COMPLETED | OUTPATIENT
Start: 2021-09-01 | End: 2021-09-01

## 2021-09-01 RX ORDER — LABETALOL HYDROCHLORIDE 5 MG/ML
5 INJECTION, SOLUTION INTRAVENOUS
Status: DISCONTINUED | OUTPATIENT
Start: 2021-09-01 | End: 2021-09-01 | Stop reason: HOSPADM

## 2021-09-01 RX ORDER — ACETAMINOPHEN 500 MG
1000 TABLET ORAL ONCE
Status: COMPLETED | OUTPATIENT
Start: 2021-09-01 | End: 2021-09-01

## 2021-09-01 RX ORDER — LIDOCAINE HYDROCHLORIDE 10 MG/ML
INJECTION, SOLUTION EPIDURAL; INFILTRATION; INTRACAUDAL; PERINEURAL
Status: DISCONTINUED
Start: 2021-09-01 | End: 2021-09-01 | Stop reason: HOSPADM

## 2021-09-01 RX ORDER — MIDAZOLAM HYDROCHLORIDE 1 MG/ML
INJECTION INTRAMUSCULAR; INTRAVENOUS PRN
Status: DISCONTINUED | OUTPATIENT
Start: 2021-09-01 | End: 2021-09-01 | Stop reason: SURG

## 2021-09-01 RX ORDER — SODIUM CHLORIDE, SODIUM LACTATE, POTASSIUM CHLORIDE, CALCIUM CHLORIDE 600; 310; 30; 20 MG/100ML; MG/100ML; MG/100ML; MG/100ML
INJECTION, SOLUTION INTRAVENOUS CONTINUOUS
Status: DISCONTINUED | OUTPATIENT
Start: 2021-09-01 | End: 2021-09-01 | Stop reason: HOSPADM

## 2021-09-01 RX ORDER — HYDRALAZINE HYDROCHLORIDE 20 MG/ML
5 INJECTION INTRAMUSCULAR; INTRAVENOUS
Status: DISCONTINUED | OUTPATIENT
Start: 2021-09-01 | End: 2021-09-01 | Stop reason: HOSPADM

## 2021-09-01 RX ORDER — CELECOXIB 200 MG/1
400 CAPSULE ORAL ONCE
Status: COMPLETED | OUTPATIENT
Start: 2021-09-01 | End: 2021-09-01

## 2021-09-01 RX ORDER — GABAPENTIN 300 MG/1
300 CAPSULE ORAL ONCE
Status: COMPLETED | OUTPATIENT
Start: 2021-09-01 | End: 2021-09-01

## 2021-09-01 RX ORDER — SODIUM CHLORIDE, SODIUM LACTATE, POTASSIUM CHLORIDE, CALCIUM CHLORIDE 600; 310; 30; 20 MG/100ML; MG/100ML; MG/100ML; MG/100ML
INJECTION, SOLUTION INTRAVENOUS
Status: DISCONTINUED | OUTPATIENT
Start: 2021-09-01 | End: 2021-09-01 | Stop reason: SURG

## 2021-09-01 RX ORDER — LIDOCAINE HYDROCHLORIDE 10 MG/ML
INJECTION, SOLUTION INFILTRATION; PERINEURAL
Status: DISCONTINUED
Start: 2021-09-01 | End: 2021-09-01 | Stop reason: HOSPADM

## 2021-09-01 RX ORDER — DEXAMETHASONE SODIUM PHOSPHATE 4 MG/ML
INJECTION, SOLUTION INTRA-ARTICULAR; INTRALESIONAL; INTRAMUSCULAR; INTRAVENOUS; SOFT TISSUE PRN
Status: DISCONTINUED | OUTPATIENT
Start: 2021-09-01 | End: 2021-09-01 | Stop reason: SURG

## 2021-09-01 RX ORDER — BUPIVACAINE HYDROCHLORIDE AND EPINEPHRINE 5; 5 MG/ML; UG/ML
INJECTION, SOLUTION EPIDURAL; INTRACAUDAL; PERINEURAL
Status: DISCONTINUED
Start: 2021-09-01 | End: 2021-09-01 | Stop reason: HOSPADM

## 2021-09-01 RX ORDER — DIPHENHYDRAMINE HYDROCHLORIDE 50 MG/ML
12.5 INJECTION INTRAMUSCULAR; INTRAVENOUS
Status: DISCONTINUED | OUTPATIENT
Start: 2021-09-01 | End: 2021-09-01 | Stop reason: HOSPADM

## 2021-09-01 RX ORDER — OXYCODONE HCL 5 MG/5 ML
5 SOLUTION, ORAL ORAL
Status: COMPLETED | OUTPATIENT
Start: 2021-09-01 | End: 2021-09-01

## 2021-09-01 RX ORDER — EPINEPHRINE 1 MG/ML(1)
AMPUL (ML) INJECTION
Status: DISCONTINUED
Start: 2021-09-01 | End: 2021-09-01 | Stop reason: HOSPADM

## 2021-09-01 RX ORDER — HALOPERIDOL 5 MG/ML
1 INJECTION INTRAMUSCULAR
Status: DISCONTINUED | OUTPATIENT
Start: 2021-09-01 | End: 2021-09-01 | Stop reason: HOSPADM

## 2021-09-01 RX ORDER — CEFAZOLIN SODIUM 2 G/100ML
2 INJECTION, SOLUTION INTRAVENOUS ONCE
Status: DISCONTINUED | OUTPATIENT
Start: 2021-09-01 | End: 2021-09-01 | Stop reason: HOSPADM

## 2021-09-01 RX ORDER — ONDANSETRON 2 MG/ML
INJECTION INTRAMUSCULAR; INTRAVENOUS PRN
Status: DISCONTINUED | OUTPATIENT
Start: 2021-09-01 | End: 2021-09-01 | Stop reason: SURG

## 2021-09-01 RX ORDER — CEFAZOLIN SODIUM 1 G/3ML
INJECTION, POWDER, FOR SOLUTION INTRAMUSCULAR; INTRAVENOUS PRN
Status: DISCONTINUED | OUTPATIENT
Start: 2021-09-01 | End: 2021-09-01 | Stop reason: SURG

## 2021-09-01 RX ADMIN — ONDANSETRON 8 MG: 2 INJECTION INTRAMUSCULAR; INTRAVENOUS at 12:04

## 2021-09-01 RX ADMIN — CELECOXIB 400 MG: 200 CAPSULE ORAL at 10:50

## 2021-09-01 RX ADMIN — GABAPENTIN 300 MG: 300 CAPSULE ORAL at 10:50

## 2021-09-01 RX ADMIN — CEFAZOLIN 2 G: 330 INJECTION, POWDER, FOR SOLUTION INTRAMUSCULAR; INTRAVENOUS at 11:46

## 2021-09-01 RX ADMIN — FENTANYL CITRATE 50 MCG: 50 INJECTION, SOLUTION INTRAMUSCULAR; INTRAVENOUS at 12:04

## 2021-09-01 RX ADMIN — DEXAMETHASONE SODIUM PHOSPHATE 10 MG: 4 INJECTION, SOLUTION INTRA-ARTICULAR; INTRALESIONAL; INTRAMUSCULAR; INTRAVENOUS; SOFT TISSUE at 11:46

## 2021-09-01 RX ADMIN — PROPOFOL 150 MG: 10 INJECTION, EMULSION INTRAVENOUS at 11:44

## 2021-09-01 RX ADMIN — OXYCODONE HYDROCHLORIDE 10 MG: 5 SOLUTION ORAL at 12:43

## 2021-09-01 RX ADMIN — ACETAMINOPHEN 1000 MG: 500 TABLET ORAL at 10:50

## 2021-09-01 RX ADMIN — MIDAZOLAM HYDROCHLORIDE 2 MG: 1 INJECTION, SOLUTION INTRAMUSCULAR; INTRAVENOUS at 11:35

## 2021-09-01 RX ADMIN — FENTANYL CITRATE 25 MCG: 50 INJECTION, SOLUTION INTRAMUSCULAR; INTRAVENOUS at 12:43

## 2021-09-01 RX ADMIN — SODIUM CHLORIDE, POTASSIUM CHLORIDE, SODIUM LACTATE AND CALCIUM CHLORIDE: 600; 310; 30; 20 INJECTION, SOLUTION INTRAVENOUS at 11:35

## 2021-09-01 RX ADMIN — PROPOFOL 50 MG: 10 INJECTION, EMULSION INTRAVENOUS at 11:56

## 2021-09-01 ASSESSMENT — PAIN DESCRIPTION - PAIN TYPE
TYPE: SURGICAL PAIN
TYPE: SURGICAL PAIN

## 2021-09-01 NOTE — ANESTHESIA POSTPROCEDURE EVALUATION
Patient: Veronica Reeves    Procedure Summary     Date: 09/01/21 Room / Location: [facility] ROOM 21 / SURGERY SAME DAY [facility]    Anesthesia Start: 1135 Anesthesia Stop: 1218    Procedure: RELEASE, TRIGGER FINGER - SMALL AND RING (Left Hand) Diagnosis:       Trigger finger, left little finger      Trigger finger, left ring finger      (Trigger finger, left little finger, Trigger finger, left ring finger])    Surgeons: Donnell Layton M.D. Responsible Provider: Aileen Robert M.D.    Anesthesia Type: general ASA Status: 2          Final Anesthesia Type: general  Last vitals  BP   Blood Pressure: 157/72    Temp   36.2 °C (97.1 °F)    Pulse   95   Resp   14    SpO2   95 %      Anesthesia Post Evaluation    Patient location during evaluation: PACU  Patient participation: complete - patient participated  Level of consciousness: awake and alert    Airway patency: patent  Anesthetic complications: no  Cardiovascular status: hemodynamically stable  Respiratory status: acceptable  Hydration status: euvolemic    PONV: none          No complications documented.     Nurse Pain Score: 5 (NPRS)

## 2021-09-01 NOTE — OP REPORT
SURGEON:  Donnell Layton MD.     ASSISTANT:  None     PREOPERATIVE DIAGNOSIS:       POSTOPERATIVE DIAGNOSIS:   Same.     PROCEDURE:   1.  Left small trigger finger release, 2.  Left ring trigger finger release     ANESTHESIOLOGIST:   Aileen Robert MD     ANESTHESIA:   General     COMPLICATIONS:  None noted.     DRAINS:  None.     SPECIMENS:  None.     ESTIMATED BLOOD LOSS:  Minimal.     INDICATIONS:  The patient is  well known to me through   my outpatient clinic for the above-mentioned diagnosis.  Since her visit in the office her left ring finger began triggering.  She requested this finger be addressed at the same time as her small finger today.  At The patient believes and I would  agree they've failed conservative treatment and are a candidate for the   above-mentioned procedure.  Prior to the procedure, the patient understood the risks,   benefits, and alternatives to surgery.  They understood the risks to include,   but not limited to the risk of infection requiring repeat surgery, bleeding   requiring blood transfusion, nerve, blood vessel, and tendon injury requiring   repair, chronic pain, failure to alleviate or worsening of his symptoms   requiring revision surgery, DVT, pulmonary embolism, heart attack, stroke, and   even death.  The patient states despite these risks, they wished to proceed   with surgery.     DESCRIPTION OF PROCEDURE:  The patient was met in the preoperative holding   area.  The ring and small fingers were initialed as correct operative sites.    She had  an opportunity to ask questions, all questions were answered and informed   consent was obtained.  The patient tolerated the procedure well.  The patient was brought to the operating   room and laid supine on the operating table.  All bony and dependent   prominences were well padded.  Ancef was administered for infection   prophylaxis.  General anesthesia was administered. The operative upper extremity was prepped and  draped in usual sterile   fashion.  A formal time-out was performed, in which all parties agreed upon   the correct patient, procedure, and operative site.  I began the procedure performing a field   block using 15 mL of 1% lidocaine with epinephrine over the affected finger A1   pulleys.     I then made approximately 1.5 cm longitudinal   incision in line with left small finger over the A1 pulley, dissected down through  subcutaneous tissues, identified and protected the neurovascular bundles.  I   identified the A1 pulley, which I released longitudinally in its entirety   under direct visualization.     I then made approximately 1.5 cm longitudinal   incision in line with left ring finger over the A1 pulley, dissected down through  subcutaneous tissues, identified and protected the neurovascular bundles.  I   identified the A1 pulley, which I released longitudinally in its entirety   under direct visualization.     I then copiously irrigated the wound with normal saline, closed the incision with interrupted 4-0 nylon   suture, covered with sterile Xeroform, 4x4s, and a soft dressing.  The patient  was transferred in a stable condition to PACU where there were no immediate   postoperative complaints.     POSTOPERATIVE PLAN:  The patient will be discharged home per same day   criteria, sedentary use of the upper extremity, and follow up in clinic in   10-14 days for suture removal and wound check.

## 2021-09-01 NOTE — OR NURSING
1309 pt arrived to phase II. Gus, spouse, brought to bedside. All belongings given to patient.     1330 D/c instructions given to pt and spouse. All questions answered. IV removed, tip intact. Dressed without assistance.     1341 pt brought out by wheelchair with ROMANA Rodriguez.

## 2021-09-01 NOTE — DISCHARGE INSTRUCTIONS
ACTIVITY: Rest and take it easy for the first 24 hours.  A responsible adult is recommended to remain with you during that time.  It is normal to feel sleepy.  We encourage you to not do anything that requires balance, judgment or coordination.    MILD FLU-LIKE SYMPTOMS ARE NORMAL. YOU MAY EXPERIENCE GENERALIZED MUSCLE ACHES, THROAT IRRITATION, HEADACHE AND/OR SOME NAUSEA.    FOR 24 HOURS DO NOT:  Drive, operate machinery or run household appliances.  Drink beer or alcoholic beverages.   Make important decisions or sign legal documents.    SPECIAL INSTRUCTIONS: Okay to remove bandage in 4 days and get incision wet and cover with bandaids.     Elevate above heart and ice frequently for at least 2 weeks. Encourage sedentary use of hand and frequent moving of fingers to prevent stiffness.     No heavy lifting or grasping for at least 4 weeks.     No driving while on narcotic pain medications. Contact auto-insurance carrier for clearance to begin driving again.     Call MD or come to ER for any major concerns.    DIET: To avoid nausea, slowly advance diet as tolerated, avoiding spicy or greasy foods for the first day.  Add more substantial food to your diet according to your physician's instructions.  Babies can be fed formula or breast milk as soon as they are hungry.  INCREASE FLUIDS AND FIBER TO AVOID CONSTIPATION.      FOLLOW-UP APPOINTMENT:  A follow-up appointment should be arranged with your doctor in [phone]; call to schedule.    You should CALL YOUR PHYSICIAN if you develop:  Fever greater than 101 degrees F.  Pain not relieved by medication, or persistent nausea or vomiting.  Excessive bleeding (blood soaking through dressing) or unexpected drainage from the wound.  Extreme redness or swelling around the incision site, drainage of pus or foul smelling drainage.  Inability to urinate or empty your bladder within 8 hours.  Problems with breathing or chest pain.    You should call 911 if you develop problems  with breathing or chest pain.  If you are unable to contact your doctor or surgical center, you should go to the nearest emergency room or urgent care center.  Physician's telephone #: [phone]    If any questions arise, call your doctor.  If your doctor is not available, please feel free to call the Surgical Center at [phone]. The Contact Center is open Monday through Friday 7AM to 5PM and may speak to a nurse at [phone], or toll free at [phone].     A registered nurse may call you a few days after your surgery to see how you are doing after your procedure.    MEDICATIONS: Resume taking daily medication.  Take prescribed pain medication with food.  If no medication is prescribed, you may take non-aspirin pain medication if needed.  PAIN MEDICATION CAN BE VERY CONSTIPATING.  Take a stool softener or laxative such as senokot, pericolace, or milk of magnesia if needed.    Prescription given for ***.  Last pain medication given at ***.    If your physician has prescribed pain medication that includes Acetaminophen (Tylenol), do not take additional Acetaminophen (Tylenol) while taking the prescribed medication.    Depression / Suicide Risk    As you are discharged from this [facility] facility, it is important to learn how to keep safe from harming yourself.    Recognize the warning signs:  · Abrupt changes in personality, positive or negative- including increase in energy   · Giving away possessions  · Change in eating patterns- significant weight changes-  positive or negative  · Change in sleeping patterns- unable to sleep or sleeping all the time   · Unwillingness or inability to communicate  · Depression  · Unusual sadness, discouragement and loneliness  · Talk of wanting to die  · Neglect of personal appearance   · Rebelliousness- reckless behavior  · Withdrawal from people/activities they love  · Confusion- inability to concentrate     If you or a loved one observes any of these behaviors  or has concerns about self-harm, here's what you can do:  · Talk about it- your feelings and reasons for harming yourself  · Remove any means that you might use to hurt yourself (examples: pills, rope, extension cords, firearm)  · Get professional help from the community (Mental Health, Substance Abuse, psychological counseling)  · Do not be alone:Call your Safe Contact- someone whom you trust who will be there for you.  · Call your local CRISIS HOTLINE 546-5728 or 660-698-6988  · Call your local Children's Mobile Crisis Response Team [place] (209) 468-9818 or www.[employer]  · Call the toll free National Suicide Prevention Hotlines   · National Suicide Prevention Lifeline 240-969-FXQG (2054)  · National Hope Line Network 800-SUICIDE (339-8587)

## 2021-09-01 NOTE — OR NURSING
1214 Patient arrived from OR. Patient sleeping but arousable. Respiration spontaneous and non- labored. Report received from Rn and anesthesiologist. Vital signs stable   1249 Patient tolerating po intake.   1300 Family updated on plan of care   1306 Report given to Peri HO all questions answered.   1309 Criteria met to transition patient to stage 2 recovery.

## 2021-09-01 NOTE — LETTER
August 25, 2021    [facility]  [address] [place] [address]  [phone]    Patient Name: Veronica Reeves  Surgeon Name: Surgeon(s):  Donnell Downing M.D.  Surgery Facility: [facility] ([address] [place] [address])  Surgery Date: 9/1/2021    The time of your surgery is not final and may change up to and until the day of your surgery. You will be contacted 24-48 hours prior to your surgery date with your check-in and surgery time.    If you will not be at one of the below numbers please call/text the surgery scheduler at Per[facility] COELLO ([phone])  Cell Phone: [phone]    Please refrain from smoking any substance after midnight prior to surgery. Smoking may interfere with the anesthetic and frequently produces nausea during the recovery period.    Continue taking all lifesaving medications. Including the morning of your surgery with small sip of water.    Please read the MEDICATION INSTRUCTIONS below completely.    DAY OF YOUR SURGERY  Nothing to eat or drink after midnight     Please arrive at the hospital/surgery center at the check-in time provided.     An adult will need to bring you and take you home after your surgery.               AFTER YOUR SURGERY  Post op Appointment:   Date: 09/16/21   Time: 11:15AM   With: DR NYLA DOWNING    Location: [facility] [phone]    TIME OFF WORK  FMLA or Disability forms can be faxed directly to: [phone] or you may drop them off at [address] [facility] [phone]. Our office charges a $35.00 fee per form. Forms will be completed within 10 business days of drop off and payment received. For the status of your forms you may contact our disability office directly [phone].    MEDICATION INSTRUCTIONS  The following medications should be stopped a minimum of 10 days prior to surgery:  All over the counter, Supplements & Herbal medications    Anorectics: Phentermine (Adipex-P, Lomaira and  Suprenza), Phentermine-topiramate (Qsymia), Bupropion-naltrexone (Contrave)    Opiod Partial Agonists/Opioid Antagonists: Buprenorphine (Subocone, Belbuca, Butrans, Probuphine Implant, Sublocade), Naltrexone (ReVia, Vivitrol), Naloxone    Amphetamines: Dextroamphetamine/Amphetamine (Adderall, Mydayis), Methylphenidate Hydrochloride (Concerta, Metadate, Methylin, Ritalin)    The following medications should be stopped 5 days prior to surgery:  Blood Thinners: Any Aspirin, Aspirin products, anti-inflammatories such as ibuprofen and any blood thinners such as Coumadin and Plavix. Please consult your prescribing physician if you are on life saving blood thinners, in regards to when to stop medications prior to surgery.     The following medications should be stopped a minimum of 3 days prior to surgery:  PDE-5 inhibitors: Sildenafil (Viagra), Tadalafil (Cialis), Vardenafil (Levitra), Avanafil (Stendra)    MAO Inhibitors: Rasagiline (Azilect), Selegiline (Eldepryl, Emsam, Selapar), Isocarboxazid (Marplan), Phenelzine (Nardil)

## 2021-09-01 NOTE — ANESTHESIA PROCEDURE NOTES
Airway    Date/Time: 9/1/2021 11:45 AM  Performed by: Aileen Robert M.D.  Authorized by: Aileen Robert M.D.     Location:  OR  Urgency:  Elective  Indications for Airway Management:  Anesthesia      Spontaneous Ventilation: absent    Sedation Level:  Deep  Preoxygenated: Yes    Mask Difficulty Assessment:  0 - not attempted  Final Airway Type:  Supraglottic airway  Final Supraglottic Airway:  Standard LMA    SGA Size:  4  Airway Seal Pressure (cm H2O):  22  Number of Attempts at Approach:  1

## 2021-09-01 NOTE — OR NURSING
Unable to place IV due to pt significant history of lymphedema in the right side.  Reviewed with both Dr. Layton and Dr. Robert.  Per Dr. Robert, order to place PIV in foot. PIV placed as ordered.  Per MD, pt does not need lab work, no labs drawn or sent.

## 2021-09-01 NOTE — ANESTHESIA TIME REPORT
Anesthesia Start and Stop Event Times     Date Time Event    9/1/2021 1117 Ready for Procedure     1135 Anesthesia Start     1218 Anesthesia Stop        Responsible Staff  09/01/21    Name Role Begin End    Aileen Robert M.D. Anesth 1135 1218        Preop Diagnosis (Free Text):  Pre-op Diagnosis     Trigger finger, left little finger,Trigger finger, left ring finger [M65.342]        Preop Diagnosis (Codes):  Diagnosis Information     Diagnosis Code(s): Trigger finger, left little finger [M65.352]     Trigger finger, left ring finger [M65.342]        Post op Diagnosis  Trigger finger      Premium Reason  Non-Premium    Comments:

## 2021-11-16 ENCOUNTER — TELEPHONE (OUTPATIENT)
Dept: INFECTIOUS DISEASES | Facility: MEDICAL CENTER | Age: 66
End: 2021-11-16

## 2021-11-16 NOTE — TELEPHONE ENCOUNTER
Patient called to check on her referral status. Patient notified that her referring provider was advised of the denied referral. Per Dr Pandey     Reviewed and DENIED by Dr Pandey  Needs GYN workup for underlining cause   Prophylaxis not recommended   Treat only if symptomatic and PCP can treat sensitive to abx   Referring office notified

## 2023-02-08 ENCOUNTER — TELEPHONE (OUTPATIENT)
Dept: CARDIOLOGY | Facility: MEDICAL CENTER | Age: 68
End: 2023-02-08
Payer: MEDICARE

## 2023-02-08 NOTE — TELEPHONE ENCOUNTER
MARITA    Called and spoke to patient in regards to rescheduling missed NP appointment with  on 002/01, per patient she has established with a different cardiologist.    Thank you!

## 2023-03-15 ENCOUNTER — OFFICE VISIT (OUTPATIENT)
Dept: CARDIOLOGY | Facility: PHYSICIAN GROUP | Age: 68
End: 2023-03-15
Payer: MEDICARE

## 2023-03-15 VITALS
SYSTOLIC BLOOD PRESSURE: 142 MMHG | RESPIRATION RATE: 12 BRPM | HEIGHT: 66 IN | WEIGHT: 225 LBS | DIASTOLIC BLOOD PRESSURE: 88 MMHG | HEART RATE: 80 BPM | OXYGEN SATURATION: 97 % | BODY MASS INDEX: 36.16 KG/M2

## 2023-03-15 DIAGNOSIS — C83.11 MANTLE CELL LYMPHOMA OF LYMPH NODES OF NECK (HCC): ICD-10-CM

## 2023-03-15 DIAGNOSIS — R06.02 SOB (SHORTNESS OF BREATH): ICD-10-CM

## 2023-03-15 DIAGNOSIS — E78.2 MIXED HYPERLIPIDEMIA: ICD-10-CM

## 2023-03-15 DIAGNOSIS — I25.10 CORONARY ARTERY DISEASE INVOLVING NATIVE CORONARY ARTERY OF NATIVE HEART WITHOUT ANGINA PECTORIS: ICD-10-CM

## 2023-03-15 DIAGNOSIS — J43.8 OTHER EMPHYSEMA (HCC): ICD-10-CM

## 2023-03-15 DIAGNOSIS — I10 ESSENTIAL HYPERTENSION, BENIGN: ICD-10-CM

## 2023-03-15 PROBLEM — R42 DIZZINESS: Status: RESOLVED | Noted: 2020-05-14 | Resolved: 2023-03-15

## 2023-03-15 PROBLEM — F51.01 PRIMARY INSOMNIA: Status: ACTIVE | Noted: 2018-11-06

## 2023-03-15 PROBLEM — Z09 HOSPITAL DISCHARGE FOLLOW-UP: Status: RESOLVED | Noted: 2020-05-14 | Resolved: 2023-03-15

## 2023-03-15 PROBLEM — F41.9 ANXIETY: Status: ACTIVE | Noted: 2018-05-24

## 2023-03-15 PROBLEM — G47.33 OBSTRUCTIVE SLEEP APNEA: Status: ACTIVE | Noted: 2018-04-12

## 2023-03-15 LAB — EKG IMPRESSION: NORMAL

## 2023-03-15 PROCEDURE — 99204 OFFICE O/P NEW MOD 45 MIN: CPT | Performed by: NURSE PRACTITIONER

## 2023-03-15 PROCEDURE — 93000 ELECTROCARDIOGRAM COMPLETE: CPT | Performed by: INTERNAL MEDICINE

## 2023-03-15 RX ORDER — ASPIRIN 81 MG/1
81 TABLET, CHEWABLE ORAL DAILY
COMMUNITY
End: 2023-07-12 | Stop reason: SDUPTHER

## 2023-03-15 RX ORDER — ROSUVASTATIN CALCIUM 20 MG/1
20 TABLET, COATED ORAL DAILY
COMMUNITY
Start: 2022-11-14 | End: 2023-03-29 | Stop reason: SDUPTHER

## 2023-03-15 RX ORDER — METOPROLOL SUCCINATE 25 MG/1
12.5 TABLET, EXTENDED RELEASE ORAL DAILY
COMMUNITY
Start: 2022-11-29 | End: 2023-06-20 | Stop reason: SDUPTHER

## 2023-03-15 RX ORDER — TRAZODONE HYDROCHLORIDE 50 MG/1
TABLET ORAL
COMMUNITY
Start: 2023-03-07

## 2023-03-15 RX ORDER — CLOPIDOGREL BISULFATE 75 MG/1
75 TABLET ORAL DAILY
COMMUNITY
End: 2023-07-12 | Stop reason: SDUPTHER

## 2023-03-15 RX ORDER — LEVOTHYROXINE SODIUM 0.12 MG/1
TABLET ORAL
COMMUNITY
Start: 2023-02-09

## 2023-03-15 ASSESSMENT — ENCOUNTER SYMPTOMS
ABDOMINAL PAIN: 0
BRUISES/BLEEDS EASILY: 0
ORTHOPNEA: 0
PND: 0
CHILLS: 0
SHORTNESS OF BREATH: 0
HEADACHES: 0
COUGH: 0
DIZZINESS: 0
NAUSEA: 0
DEPRESSION: 1
FEVER: 0
INSOMNIA: 1
MYALGIAS: 0
NERVOUS/ANXIOUS: 1
LOSS OF CONSCIOUSNESS: 0
PALPITATIONS: 0

## 2023-03-15 ASSESSMENT — FIBROSIS 4 INDEX: FIB4 SCORE: 1.26

## 2023-03-15 NOTE — PROGRESS NOTES
Chief Complaint   Patient presents with    New Patient     Dx: SOB    Coronary Artery Disease    HTN (Controlled)    Hyperlipidemia       Subjective     NEW PATIENT CONSULTATION:    Veronica Reeves is a 68 y.o. female who presents today, self-referred, for evaluation of CAD, HTN and hyperlipidemia.    Veronica is a 68 year old female with history of breast cancer in 2007 and more recently Mantel cell lymphoma in 2020, followed by oncology. She has done several rounds of chemotherapy and radiation. She also has some possible RA, history of PTSD/anxiety and COPD.    In November 2022, she had PCI/DAVID x 2 to the mid LCx and distal RCA, and was followed by [facility]. Her medical therapy was adjusted, but she does not have an updated, current medication list.    Her , while hospitalized at [facility] last week, had a fall and apparently went into cardiac arrest due to possible internal bleeding, and passed away. She is very emotional today, and acutely grieving.    She does note some ongoing shortness of breath, but no orthopnea or PND; no overt chest pain, pressure or discomfort; no palpitations; no dizziness or syncope; no LE edema. She does use CBD gummies to help her sleep. She is still smoking 3-5 cigarettes per day.    Cardiovascular Risk Factors:  1. Smoking status: yes, 3-5 cigarettes per day  2. Type II Diabetes Mellitus: no  3. Hypertension: yes  4. Dyslipidemia: yes  5. Family history of early Coronary Artery Disease in a first degree relative (Male less than 55 years of age; Female less than 65 years of age): yes  6.  Obesity and/or Metabolic Syndrome: BMI 36.32  7. Sedentary lifestyle: yes     Past Medical History:   Diagnosis Date    Breast cancer (HCC) 2007    CAD (coronary artery disease) 11/2022    PCI/DAVID x 2 (Synergy 2.75 x 24mm to the mid LCx, Synergy 2.75 x 24mm to the distal RCA).    Cancer (HCC) 2020    Mantle cell lymphoma    Cervical cancer (HCC)     Chronic pelvic pain in female      COPD (chronic obstructive pulmonary disease) ([facility])     Encounter for long-term (current) use of other medications     Hyperlipidemia     Hypertension     Migraine     Neck injury     RA (rheumatoid arthritis) ([facility])     Recurrent cystitis      Past Surgical History:   Procedure Laterality Date    PB INCISE FINGER TENDON SHEATH Left 9/1/2021    Procedure: RELEASE, TRIGGER FINGER - SMALL AND RING;  Surgeon: Donnell Layton M.D.;  Location: SURGERY SAME DAY [facility];  Service: Orthopedics    APPENDECTOMY      MASTECTOMY Bilateral     OTHER      Vaginal reconstruction    OTHER NEUROLOGICAL SURG      cervical and lumbar surgeries    VAGINAL SUSPENSION       Family History   Problem Relation Age of Onset    Lung Disease Mother     Stroke Father     Autoimmune Disease Father         Crohn    No Known Problems Sister     No Known Problems Brother     Multiple Sclerosis Daughter     Diabetes Daughter      Social History     Socioeconomic History    Marital status:      Spouse name: Not on file    Number of children: Not on file    Years of education: Not on file    Highest education level: Not on file   Occupational History     Comment: Disabled   Tobacco Use    Smoking status: Every Day     Packs/day: 0.50     Years: 40.00     Pack years: 20.00     Types: Cigarettes    Smokeless tobacco: Never    Tobacco comments:     3-5 cigarettes qd   Vaping Use    Vaping Use: Never used   Substance and Sexual Activity    Alcohol use: Yes     Alcohol/week: 1.8 oz     Types: 3 Glasses of wine per week     Comment: 2-3 glasses wine weekly    Drug use: Yes     Types: Marijuana, Inhaled     Comment: cbd cream    Sexual activity: Not Currently   Other Topics Concern    Not on file   Social History Narrative    Not on file     Social Determinants of Health     Financial Resource Strain: Not on file   Food Insecurity: Not on file   Transportation Needs: Not on file   Physical Activity: Not on file   Stress: Not on file   Social  Connections: Not on file   Intimate Partner Violence: Not on file   Housing Stability: Not on file     Allergies   Allergen Reactions    Ciprofloxacin Swelling and Rash     Other reaction(s): Unknown-Explain in Comments  Does not want to take r/t side effects  Does not want to take r/t side effects  Face swelling, sob    Other reaction(s): Swelling    Hydrocodone Anaphylaxis and Rash     Swollen face and rash     Bee Itching    Hydrocodone-Acetaminophen      Other reaction(s): Swelling    Linaclotide Anxiety    Oxycodone-Aspirin Swelling     Takes hydromorphone at home  Takes hydromorphone at home      Percocet [Oxycodone-Acetaminophen] Rash     Rash and swollen face     Percocet [Oxycodone-Acetaminophen]      Other reaction(s): Swelling    Darvocet [Propoxyphene N-Apap]      Other reaction(s): Other (See Comments)  Darvocet: severe constipation.     Outpatient Encounter Medications as of 3/15/2023   Medication Sig Dispense Refill    traZODone (DESYREL) 50 MG Tab       levothyroxine (SYNTHROID) 125 MCG Tab       aspirin (ASA) 81 MG Chew Tab chewable tablet Chew 81 mg every day.      clopidogrel (PLAVIX) 75 MG Tab Take 75 mg by mouth every day.      metoprolol SR (TOPROL XL) 25 MG TABLET SR 24 HR Take 12.5 mg by mouth every day.      rosuvastatin (CRESTOR) 20 MG Tab Take 20 mg by mouth every day.      celecoxib (CELEBREX) 200 MG Cap Take 1 Capsule by mouth 2 times a day. 30 Capsule 1    ALBUTEROL SULFATE INH Inhale as needed.      Celery Seed Oil Celery extract orally at night      coenzyme Q-10 30 MG capsule Take 60 mg by mouth every day.      diazePAM (VALIUM) 5 MG Tab Take 5 mg by mouth every 8 hours as needed.      lidocaine (LIDODERM) 5 % Patch as needed.      DULoxetine (CYMBALTA) 20 MG Cap DR Particles Take 60 mg by mouth at bedtime.      Cranberry 200 MG Cap Take  by mouth.      Magnesium 250 MG Tab Take 250 mg by mouth at bedtime.      [DISCONTINUED] celecoxib (CELEBREX) 200 MG Cap  (Patient not taking:  "Reported on 3/15/2023)      [DISCONTINUED] cefTRIAXone Sodium (ROCEPHIN INJ) Inject  as directed. Gets for UTI, which she gets frequently (Patient not taking: Reported on 3/15/2023)      [DISCONTINUED] D-Mannose Powder Take  by mouth.      [DISCONTINUED] doxepin (SINEQUAN) 10 MG Cap Take 10 mg by mouth at bedtime. (Patient not taking: Reported on 3/15/2023)      [DISCONTINUED] atorvastatin (LIPITOR) 20 MG Tab Take 1 Tab by mouth every day. (Patient taking differently: Take 20 mg by mouth at bedtime.) 90 Tab 0    [DISCONTINUED] nitroglycerin (NITROSTAT) 0.4 MG SL Tab Place 0.4 mg under tongue.       No facility-administered encounter medications on file as of 3/15/2023.     Review of Systems   Constitutional:  Positive for malaise/fatigue. Negative for chills and fever.   HENT:  Negative for congestion.    Respiratory:  Negative for cough and shortness of breath.    Cardiovascular:  Negative for chest pain, palpitations, orthopnea, leg swelling and PND.   Gastrointestinal:  Negative for abdominal pain and nausea.   Musculoskeletal:  Negative for myalgias.   Skin:  Negative for rash.   Neurological:  Negative for dizziness, loss of consciousness and headaches.   Endo/Heme/Allergies:  Does not bruise/bleed easily.   Psychiatric/Behavioral:  Positive for depression. The patient is nervous/anxious and has insomnia.         Due to recent loss of .            Objective     BP (!) 142/88 (BP Location: Left arm, Patient Position: Sitting, BP Cuff Size: Adult)   Pulse 80   Resp 12   Ht 1.676 m (5' 6\")   Wt 102 kg (225 lb)   SpO2 97%   BMI 36.32 kg/m²     Physical Exam  Constitutional:       Appearance: She is well-developed.   HENT:      Head: Normocephalic.   Neck:      Vascular: No JVD.   Cardiovascular:      Rate and Rhythm: Normal rate and regular rhythm.      Heart sounds: Normal heart sounds.   Pulmonary:      Effort: Pulmonary effort is normal. No respiratory distress.      Breath sounds: Normal breath " sounds. No wheezing or rales.   Abdominal:      General: Bowel sounds are normal. There is no distension.      Palpations: Abdomen is soft.      Tenderness: There is no abdominal tenderness.   Musculoskeletal:         General: Normal range of motion.      Cervical back: Normal range of motion and neck supple.   Skin:     General: Skin is warm and dry.      Findings: No rash.   Neurological:      Mental Status: She is alert and oriented to person, place, and time.   Psychiatric:         Mood and Affect: Mood normal.         Behavior: Behavior normal.      Comments: She is quite tearful, and acute grieving.     EKG ordered and interpreted by me today reveals sinus rhythm at 78bpm.     Interpretation Summary of TTE of 1/23/2023:  There is mild-moderate concentric left ventricular hypertrophy. No regional wall motion   abnormalities noted. The Ejection Fraction estimate is 55-60%   The right ventricular systolic function is normal.   There is no pericardial effusion.     Procedures performed on 11/28/2022:  1. Ultrasound-guided vascular access of the right antecubital vein  2. Ultrasound-guided vascular access of the right common femoral vein  3. Ultrasound-guided vascular access of the right femoral artery  4. Angiography of the right common femoral artery   5. PTCA of the mid circumflex  6. PCI of the mid circumflex with the 2.75 x 24 mm synergy DAVID  7. PTCA of the distal RCA  8. PCI of the distal RCA with with a 2.75 x 24 mm synergy DAVID   9. Angiography of the right and left coronary arteries  10. Left heart catheterization    LABS AS OF 2/2/2023:  Cholesterol, S/P <=200 mg/dL 295 High     Triglycerides <=150 mg/dL 280 High     LDL Cholesterol <=99 mg/dL 232 High     HDL Cholesterol 40 - 59 mg/dL 31 Low     Cholesterol/HDL Ratio <=4.5 ratio 9.5 High         Assessment & Plan     1. Coronary artery disease involving native coronary artery of native heart without angina pectoris        2. Essential hypertension, benign         3. Mixed hyperlipidemia        4. Other emphysema (HCC)        5. SOB (shortness of breath)  EKG      6. Mantle cell lymphoma of lymph nodes of neck (HCC)            Medical Decision Making: Today's Assessment/Status/Plan:      1. CAD, status post PCI/DAVID x 2 in November 2022 (LCx and RCA), presumably on ASA, Plavix, BB and statin. Her medication list is not quite up to date, and I've asked her to contact me with her correct medications and doses. She is quite emotionally upset today, understandably, given her 's rather recent death. I will see her back in 3-4 weeks, when we can have a more productive conversation. Her EKG today is stable.    2. Hypertension, treated, again with unknown dose of Metoprolol (?). She is asked to contact our office with correct medications.    3. Hyperlipidemia, previously treated with statin. Lipid panel last month showed LDL of 232; she would most likely benefit from more aggressive lipid therapy (PCSK9 inhibitors), and again, we will discuss this at follow-up in a couple of weeks.    4. Mantle cell lymphoma, followed by oncology ([facility]).    5. COPD/smoking, ongoing. We will discuss tobacco cessation at next follow-up.    6. Acute grief, due to 's recent death.    She is asked to give us an updated list of her medications and doses, as soon as she is able to. I will see her back in 3-4 weeks, to review, and discuss treatment plan moving forward.

## 2023-03-29 DIAGNOSIS — E78.2 MIXED HYPERLIPIDEMIA: ICD-10-CM

## 2023-03-29 RX ORDER — ROSUVASTATIN CALCIUM 20 MG/1
20 TABLET, COATED ORAL DAILY
Qty: 100 TABLET | Refills: 3 | Status: SHIPPED | OUTPATIENT
Start: 2023-03-29 | End: 2023-07-25 | Stop reason: SDUPTHER

## 2023-04-12 ENCOUNTER — OFFICE VISIT (OUTPATIENT)
Dept: CARDIOLOGY | Facility: PHYSICIAN GROUP | Age: 68
End: 2023-04-12
Payer: MEDICARE

## 2023-04-12 VITALS
OXYGEN SATURATION: 95 % | DIASTOLIC BLOOD PRESSURE: 70 MMHG | WEIGHT: 224.87 LBS | HEART RATE: 76 BPM | HEIGHT: 66 IN | BODY MASS INDEX: 36.14 KG/M2 | RESPIRATION RATE: 16 BRPM | SYSTOLIC BLOOD PRESSURE: 110 MMHG

## 2023-04-12 DIAGNOSIS — R60.0 LOCALIZED EDEMA: ICD-10-CM

## 2023-04-12 DIAGNOSIS — I10 ESSENTIAL HYPERTENSION, BENIGN: ICD-10-CM

## 2023-04-12 DIAGNOSIS — I25.10 CORONARY ARTERY DISEASE INVOLVING NATIVE CORONARY ARTERY OF NATIVE HEART WITHOUT ANGINA PECTORIS: ICD-10-CM

## 2023-04-12 DIAGNOSIS — J43.8 OTHER EMPHYSEMA (HCC): ICD-10-CM

## 2023-04-12 DIAGNOSIS — M06.9 RHEUMATOID ARTHRITIS, INVOLVING UNSPECIFIED SITE, UNSPECIFIED WHETHER RHEUMATOID FACTOR PRESENT (HCC): ICD-10-CM

## 2023-04-12 DIAGNOSIS — E78.2 MIXED HYPERLIPIDEMIA: ICD-10-CM

## 2023-04-12 DIAGNOSIS — G47.33 OBSTRUCTIVE SLEEP APNEA: ICD-10-CM

## 2023-04-12 PROCEDURE — 99214 OFFICE O/P EST MOD 30 MIN: CPT | Performed by: NURSE PRACTITIONER

## 2023-04-12 RX ORDER — HYDROCHLOROTHIAZIDE 12.5 MG/1
12.5 CAPSULE, GELATIN COATED ORAL
Qty: 30 CAPSULE | Refills: 1 | Status: SHIPPED | OUTPATIENT
Start: 2023-04-12 | End: 2023-07-25 | Stop reason: SDUPTHER

## 2023-04-12 ASSESSMENT — ENCOUNTER SYMPTOMS
FEVER: 0
PND: 0
COUGH: 0
ABDOMINAL PAIN: 0
PALPITATIONS: 0
ORTHOPNEA: 0
BRUISES/BLEEDS EASILY: 0
DIZZINESS: 0
SHORTNESS OF BREATH: 0
MYALGIAS: 0
NAUSEA: 0
CHILLS: 0
DEPRESSION: 1
INSOMNIA: 0
HEADACHES: 0
LOSS OF CONSCIOUSNESS: 0

## 2023-04-12 ASSESSMENT — FIBROSIS 4 INDEX: FIB4 SCORE: 1.26

## 2023-04-12 NOTE — PROGRESS NOTES
Chief Complaint   Patient presents with    Follow-Up    Coronary Artery Disease    HTN (Controlled)    Hyperlipidemia       Subjective     Veronica Reeves is a 68 y.o. female who presents today for one month follow-up of CAD, HTN and hyperlipidemia.    Veronica is a 68 year old female with history of breast cancer in 2007 and more recently Mantel cell lymphoma in 2020, followed by oncology. She has done several rounds of chemotherapy and radiation. She also has some possible RA, history of PTSD/anxiety and COPD.     In November 2022, she had PCI/DAVID x 2 to the mid LCx and distal RCA, and was followed by [facility].     She was seen by  me on 2/24/2023 to establish care, but during the visit, she was acutely grieving her , who had passed away at [facility] after a fall and cardiac arrest. We made sure to restart her medications, and she is here today for follow-up.     Overall, she is doing better, but admits to ongoing fatigue. She is sleeping alright; she is just tending to all of business after her 's death.     She denies any over chest pain, pressure, tightness or discomfort; no palpitations; breathing is stable, with no dyspnea, orthopnea or PND; no dizziness or syncope; no LE edema, but some mild edema in her hands. She does use CBD gummies to help her sleep. She is still smoking 3-5 cigarettes per day.    Past Medical History:   Diagnosis Date    Breast cancer (HCC) 2007    CAD (coronary artery disease) 11/2022    PCI/DAVID x 2 (Synergy 2.75 x 24mm to the mid LCx, Synergy 2.75 x 24mm to the distal RCA).    Cancer (HCC) 2020    Mantle cell lymphoma    Cervical cancer (HCC)     Chronic pelvic pain in female     COPD (chronic obstructive pulmonary disease) ([facility])     Encounter for long-term (current) use of other medications     Hyperlipidemia     Hypertension     Migraine     Neck injury     RA (rheumatoid arthritis) ([facility])     Recurrent cystitis      Past Surgical History:   Procedure Laterality  Date    PB INCISE FINGER TENDON SHEATH Left 9/1/2021    Procedure: RELEASE, TRIGGER FINGER - SMALL AND RING;  Surgeon: Donnell Layton M.D.;  Location: SURGERY SAME DAY [facility];  Service: Orthopedics    APPENDECTOMY      MASTECTOMY Bilateral     OTHER      Vaginal reconstruction    OTHER NEUROLOGICAL SURG      cervical and lumbar surgeries    VAGINAL SUSPENSION       Family History   Problem Relation Age of Onset    Lung Disease Mother     Stroke Father     Autoimmune Disease Father         Crohn    No Known Problems Sister     No Known Problems Brother     Multiple Sclerosis Daughter     Diabetes Daughter      Social History     Socioeconomic History    Marital status:      Spouse name: Not on file    Number of children: Not on file    Years of education: Not on file    Highest education level: Not on file   Occupational History     Comment: Disabled   Tobacco Use    Smoking status: Every Day     Packs/day: 0.50     Years: 40.00     Pack years: 20.00     Types: Cigarettes    Smokeless tobacco: Never    Tobacco comments:     3-5 cigarettes qd   Vaping Use    Vaping Use: Never used   Substance and Sexual Activity    Alcohol use: Yes     Alcohol/week: 1.8 oz     Types: 3 Glasses of wine per week     Comment: 2-3 glasses wine weekly    Drug use: Yes     Types: Marijuana, Inhaled     Comment: cbd cream    Sexual activity: Not Currently   Other Topics Concern    Not on file   Social History Narrative    Not on file     Social Determinants of Health     Financial Resource Strain: Not on file   Food Insecurity: Not on file   Transportation Needs: Not on file   Physical Activity: Not on file   Stress: Not on file   Social Connections: Not on file   Intimate Partner Violence: Not on file   Housing Stability: Not on file     Allergies   Allergen Reactions    Ciprofloxacin Swelling and Rash     Other reaction(s): Unknown-Explain in Comments  Does not want to take r/t side effects  Does not want to take r/t side  effects  Face swelling, sob    Other reaction(s): Swelling    Hydrocodone Anaphylaxis and Rash     Swollen face and rash     Bee Itching    Hydrocodone-Acetaminophen      Other reaction(s): Swelling    Linaclotide Anxiety    Oxycodone-Aspirin Swelling     Takes hydromorphone at home  Takes hydromorphone at home      Percocet [Oxycodone-Acetaminophen] Rash     Rash and swollen face     Percocet [Oxycodone-Acetaminophen]      Other reaction(s): Swelling    Darvocet [Propoxyphene N-Apap]      Other reaction(s): Other (See Comments)  Darvocet: severe constipation.     Outpatient Encounter Medications as of 4/12/2023   Medication Sig Dispense Refill    hydrochlorothiazide (MICROZIDE) 12.5 MG capsule Take 1 Capsule by mouth 1 time a day as needed (swelling/edema). 30 Capsule 1    rosuvastatin (CRESTOR) 20 MG Tab Take 1 Tablet by mouth every day. 100 Tablet 3    traZODone (DESYREL) 50 MG Tab       levothyroxine (SYNTHROID) 125 MCG Tab       aspirin (ASA) 81 MG Chew Tab chewable tablet Chew 81 mg every day.      clopidogrel (PLAVIX) 75 MG Tab Take 75 mg by mouth every day.      metoprolol SR (TOPROL XL) 25 MG TABLET SR 24 HR Take 12.5 mg by mouth every day.      celecoxib (CELEBREX) 200 MG Cap Take 1 Capsule by mouth 2 times a day. 30 Capsule 1    ALBUTEROL SULFATE INH Inhale as needed.      Celery Seed Oil Celery extract orally at night      coenzyme Q-10 30 MG capsule Take 60 mg by mouth every day.      diazePAM (VALIUM) 5 MG Tab Take 5 mg by mouth every 8 hours as needed.      lidocaine (LIDODERM) 5 % Patch as needed.      DULoxetine (CYMBALTA) 20 MG Cap DR Particles Take 60 mg by mouth at bedtime.      Cranberry 200 MG Cap Take  by mouth.      Magnesium 250 MG Tab Take 250 mg by mouth at bedtime.       No facility-administered encounter medications on file as of 4/12/2023.     Review of Systems   Constitutional:  Positive for malaise/fatigue. Negative for chills and fever.   HENT:  Negative for congestion.   "  Respiratory:  Negative for cough and shortness of breath.    Cardiovascular:  Negative for chest pain, palpitations, orthopnea, leg swelling and PND.   Gastrointestinal:  Negative for abdominal pain and nausea.   Musculoskeletal:  Negative for myalgias.   Skin:  Negative for rash.   Neurological:  Negative for dizziness, loss of consciousness and headaches.   Endo/Heme/Allergies:  Does not bruise/bleed easily.   Psychiatric/Behavioral:  Positive for depression. The patient does not have insomnia.         Due to recent loss of .            Objective     /70 (BP Location: Left arm, Patient Position: Sitting, BP Cuff Size: Adult)   Pulse 76   Resp 16   Ht 1.676 m (5' 6\")   Wt 102 kg (224 lb 13.9 oz)   SpO2 95%   BMI 36.29 kg/m²     Physical Exam  Constitutional:       Appearance: She is well-developed.   HENT:      Head: Normocephalic.   Neck:      Vascular: No JVD.   Cardiovascular:      Rate and Rhythm: Normal rate and regular rhythm.      Heart sounds: Normal heart sounds.   Pulmonary:      Effort: Pulmonary effort is normal. No respiratory distress.      Breath sounds: Normal breath sounds. No wheezing or rales.   Abdominal:      General: Bowel sounds are normal. There is no distension.      Palpations: Abdomen is soft.      Tenderness: There is no abdominal tenderness.   Musculoskeletal:         General: Normal range of motion.      Cervical back: Normal range of motion and neck supple.   Skin:     General: Skin is warm and dry.      Findings: No rash.   Neurological:      Mental Status: She is alert and oriented to person, place, and time.   Psychiatric:         Mood and Affect: Mood normal.         Behavior: Behavior normal.      Comments: Still grieving her , but seems much better emotionally today.     Interpretation Summary of TTE of 1/23/2023:  There is mild-moderate concentric left ventricular hypertrophy. No regional wall motion   abnormalities noted. The Ejection Fraction " estimate is 55-60%   The right ventricular systolic function is normal.   There is no pericardial effusion.      Procedures performed on 11/28/2022:  1. Ultrasound-guided vascular access of the right antecubital vein  2. Ultrasound-guided vascular access of the right common femoral vein  3. Ultrasound-guided vascular access of the right femoral artery  4. Angiography of the right common femoral artery   5. PTCA of the mid circumflex  6. PCI of the mid circumflex with the 2.75 x 24 mm synergy DAVID  7. PTCA of the distal RCA  8. PCI of the distal RCA with with a 2.75 x 24 mm synergy DAVID   9. Angiography of the right and left coronary arteries  10. Left heart catheterization     LABS AS OF 2/2/2023:  Cholesterol, S/P <=200 mg/dL 295 High     Triglycerides <=150 mg/dL 280 High     LDL Cholesterol <=99 mg/dL 232 High     HDL Cholesterol 40 - 59 mg/dL 31 Low     Cholesterol/HDL Ratio <=4.5 ratio 9.5 High              Assessment & Plan     1. Coronary artery disease involving native coronary artery of native heart without angina pectoris        2. Essential hypertension, benign        3. Mixed hyperlipidemia  Comp Metabolic Panel    Lipid Profile      4. Localized edema  hydrochlorothiazide (MICROZIDE) 12.5 MG capsule      5. Other emphysema (HCC)        6. Obstructive sleep apnea        7. Rheumatoid arthritis, involving unspecified site, unspecified whether rheumatoid factor present (HCC)            Medical Decision Making: Today's Assessment/Status/Plan:      1. CAD, status post PCI/DAVID x 2 in November 2022 (LCx and RCA), on ASA, Plavix, BB and statin. She is not having any angina or shortness of breath. Consider addition of low dose ACEi or ARB in the future.     2. Hypertension, treated with Toprol XL 12.5mg once daily.     3. Hyperlipidemia, treated with Crestor 20mg. To repeat CPM and lipid panel, to assess response. LDL is quite high, and she may more aggressive treatment (PCSK9 inhibitor?)     4. Mantle cell  lymphoma, followed by oncology ([facility]).     5. COPD/smoking, ongoing Urged to try to cut down further and ideally quit smoking.    6. Mild edema, mostly in hands, possibly due to RA. Can try some HCTZ 12.5mg once daily PRN.    7. Grief reaction, due to 's recent death. She seems to be doing better doing.     Same medications for now. Repeat labs as above. We will make changes based on these results. Follow-up with me in 3 months, sooner if clinical condition changes.

## 2023-04-24 ENCOUNTER — PATIENT MESSAGE (OUTPATIENT)
Dept: CARDIOLOGY | Facility: PHYSICIAN GROUP | Age: 68
End: 2023-04-24
Payer: MEDICARE

## 2023-06-20 ENCOUNTER — TELEPHONE (OUTPATIENT)
Dept: CARDIOLOGY | Facility: MEDICAL CENTER | Age: 68
End: 2023-06-20
Payer: MEDICARE

## 2023-06-20 DIAGNOSIS — I10 ESSENTIAL HYPERTENSION, BENIGN: ICD-10-CM

## 2023-06-20 RX ORDER — METOPROLOL SUCCINATE 25 MG/1
12.5 TABLET, EXTENDED RELEASE ORAL DAILY
Qty: 45 TABLET | Refills: 3 | Status: SHIPPED | OUTPATIENT
Start: 2023-06-20 | End: 2023-07-12 | Stop reason: SDUPTHER

## 2023-06-20 NOTE — TELEPHONE ENCOUNTER
Is the patient due for a refill? Yes    Was the patient seen the past year? Yes    Date of last office visit: 4/12/2023    Does the patient have an upcoming appointment?  Yes   If yes, When? 7/12/2023    Provider to refill:AB, out of office    Does the patients insurance require a 100 day supply?  No

## 2023-06-20 NOTE — TELEPHONE ENCOUNTER
AB    Caller: Veronica Reeves    Medication Name and Dosage:   metoprolol SR (TOPROL XL) 25 MG TABLET SR 24 HR [[id number]] -Take 12.5 mg by mouth every day.    Please call your pharmacy and have them send us a refill request or speak to a live representative, RX number may have changed.    Medication amount left: 0    Preferred Pharmacy: [facility] on file     Other questions (Topic): PT requesting a 90 day supply.     Callback Number (Will only call for issues): [phone]    Thank You  Jacquelin REDMOND

## 2023-07-12 ENCOUNTER — OFFICE VISIT (OUTPATIENT)
Dept: CARDIOLOGY | Facility: PHYSICIAN GROUP | Age: 68
End: 2023-07-12
Payer: MEDICARE

## 2023-07-12 VITALS
SYSTOLIC BLOOD PRESSURE: 124 MMHG | OXYGEN SATURATION: 96 % | DIASTOLIC BLOOD PRESSURE: 70 MMHG | HEART RATE: 82 BPM | BODY MASS INDEX: 34.07 KG/M2 | WEIGHT: 212 LBS | RESPIRATION RATE: 12 BRPM | HEIGHT: 66 IN

## 2023-07-12 DIAGNOSIS — G47.33 OBSTRUCTIVE SLEEP APNEA: ICD-10-CM

## 2023-07-12 DIAGNOSIS — J43.8 OTHER EMPHYSEMA (HCC): ICD-10-CM

## 2023-07-12 DIAGNOSIS — E78.2 MIXED HYPERLIPIDEMIA: ICD-10-CM

## 2023-07-12 DIAGNOSIS — I25.10 CORONARY ARTERY DISEASE INVOLVING NATIVE CORONARY ARTERY OF NATIVE HEART WITHOUT ANGINA PECTORIS: ICD-10-CM

## 2023-07-12 DIAGNOSIS — I10 ESSENTIAL HYPERTENSION, BENIGN: ICD-10-CM

## 2023-07-12 DIAGNOSIS — C83.11 MANTLE CELL LYMPHOMA OF LYMPH NODES OF NECK (HCC): ICD-10-CM

## 2023-07-12 PROCEDURE — 3078F DIAST BP <80 MM HG: CPT | Performed by: NURSE PRACTITIONER

## 2023-07-12 PROCEDURE — 3074F SYST BP LT 130 MM HG: CPT | Performed by: NURSE PRACTITIONER

## 2023-07-12 PROCEDURE — 99214 OFFICE O/P EST MOD 30 MIN: CPT | Performed by: NURSE PRACTITIONER

## 2023-07-12 RX ORDER — ASPIRIN 81 MG/1
81 TABLET, CHEWABLE ORAL DAILY
Qty: 100 TABLET | Refills: 3 | Status: SHIPPED | OUTPATIENT
Start: 2023-07-12

## 2023-07-12 RX ORDER — METOPROLOL SUCCINATE 25 MG/1
12.5 TABLET, EXTENDED RELEASE ORAL DAILY
Qty: 50 TABLET | Refills: 3 | Status: SHIPPED | OUTPATIENT
Start: 2023-07-12 | End: 2023-08-14 | Stop reason: SDUPTHER

## 2023-07-12 RX ORDER — CLOPIDOGREL BISULFATE 75 MG/1
75 TABLET ORAL DAILY
Qty: 100 TABLET | Refills: 3 | Status: SHIPPED | OUTPATIENT
Start: 2023-07-12 | End: 2023-08-14 | Stop reason: SDUPTHER

## 2023-07-12 ASSESSMENT — ENCOUNTER SYMPTOMS
INSOMNIA: 0
BRUISES/BLEEDS EASILY: 0
SHORTNESS OF BREATH: 0
DEPRESSION: 1
COUGH: 0
LOSS OF CONSCIOUSNESS: 0
MYALGIAS: 0
NAUSEA: 0
FEVER: 0
ORTHOPNEA: 0
CHILLS: 0
DIZZINESS: 0
PALPITATIONS: 0
PND: 0
HEADACHES: 0
ABDOMINAL PAIN: 0

## 2023-07-12 ASSESSMENT — FIBROSIS 4 INDEX: FIB4 SCORE: 1.19

## 2023-07-12 NOTE — PROGRESS NOTES
Chief Complaint   Patient presents with    Follow-Up    Coronary Artery Disease    HTN (Controlled)    Hyperlipidemia       Subjective     Veronica Reeves is a 68 y.o. female who presents today for three month follow-up of CAD, HTN, hyperlipidemia, and COPD.    Veronica is a 68 year old female with history of breast cancer in 2007 and Mantle cell lymphoma in 2020, followed by oncology. She has done several rounds of chemotherapy and radiation. She also has some possible RA, history of PTSD/anxiety and COPD.     In November 2022, she had PCI/DAVID x 2 to the mid LCx and distal RCA, and was followed by [facility].      She was seen by me in February 2023 to establish care, but during the visit, she was acutely grieving her , who had passed away at [facility] after a fall and cardiac arrest just the week before. We made sure to restart her medications, and she was last seen by me in April 2023.     She is here today for three month follow-up.  She denies any overt chest pain, pressure, tightness or discomfort; no palpitations; breathing is stable, with no dyspnea, orthopnea or PND; no dizziness or syncope; no LE edema, but some mild edema in her hands (RA). She does use CBD gummies to help her sleep. She is still smoking 3-5 cigarettes per day.       Past Medical History:   Diagnosis Date    Breast cancer (HCC) 2007    CAD (coronary artery disease) 11/2022    PCI/DAVID x 2 (Synergy 2.75 x 24mm to the mid LCx, Synergy 2.75 x 24mm to the distal RCA).    Cancer (HCC) 2020    Mantle cell lymphoma    Cervical cancer (HCC)     Chronic pelvic pain in female     COPD (chronic obstructive pulmonary disease) ([facility])     Encounter for long-term (current) use of other medications     Hyperlipidemia     Hypertension     Migraine     Neck injury     RA (rheumatoid arthritis) ([facility])     Recurrent cystitis      Past Surgical History:   Procedure Laterality Date    PB INCISE FINGER TENDON SHEATH Left 9/1/2021    Procedure: RELEASE,  TRIGGER FINGER - SMALL AND RING;  Surgeon: Donnell Layton M.D.;  Location: SURGERY SAME DAY [facility];  Service: Orthopedics    APPENDECTOMY      MASTECTOMY Bilateral     OTHER      Vaginal reconstruction    OTHER NEUROLOGICAL SURG      cervical and lumbar surgeries    VAGINAL SUSPENSION       Family History   Problem Relation Age of Onset    Lung Disease Mother     Stroke Father     Autoimmune Disease Father         Crohn    No Known Problems Sister     No Known Problems Brother     Multiple Sclerosis Daughter     Diabetes Daughter      Social History     Socioeconomic History    Marital status:      Spouse name: Not on file    Number of children: Not on file    Years of education: Not on file    Highest education level: Not on file   Occupational History     Comment: Disabled   Tobacco Use    Smoking status: Every Day     Packs/day: 0.50     Years: 40.00     Pack years: 20.00     Types: Cigarettes    Smokeless tobacco: Never    Tobacco comments:     3-5 cigarettes qd   Vaping Use    Vaping Use: Never used   Substance and Sexual Activity    Alcohol use: Yes     Alcohol/week: 1.8 oz     Types: 3 Glasses of wine per week     Comment: 2-3 glasses wine weekly    Drug use: Yes     Types: Marijuana, Inhaled     Comment: cbd cream    Sexual activity: Not Currently   Other Topics Concern    Not on file   Social History Narrative    Not on file     Social Determinants of Health     Financial Resource Strain: Not on file   Food Insecurity: Not on file   Transportation Needs: Not on file   Physical Activity: Not on file   Stress: Not on file   Social Connections: Not on file   Intimate Partner Violence: Not on file   Housing Stability: Not on file     Allergies   Allergen Reactions    Ciprofloxacin Swelling and Rash     Other reaction(s): Unknown-Explain in Comments  Does not want to take r/t side effects  Does not want to take r/t side effects  Face swelling, sob    Other reaction(s): Swelling    Hydrocodone  Anaphylaxis and Rash     Swollen face and rash     Bee Itching    Hydrocodone-Acetaminophen      Other reaction(s): Swelling    Linaclotide Anxiety    Oxycodone-Aspirin Swelling     Takes hydromorphone at home  Takes hydromorphone at home      Percocet [Oxycodone-Acetaminophen] Rash     Rash and swollen face     Percocet [Oxycodone-Acetaminophen]      Other reaction(s): Swelling    Darvocet [Propoxyphene N-Apap]      Other reaction(s): Other (See Comments)  Darvocet: severe constipation.     Outpatient Encounter Medications as of 7/12/2023   Medication Sig Dispense Refill    clopidogrel (PLAVIX) 75 MG Tab Take 1 Tablet by mouth every day. 100 Tablet 3    metoprolol SR (TOPROL XL) 25 MG TABLET SR 24 HR Take 0.5 Tablets by mouth every day. 50 Tablet 3    aspirin (ASA) 81 MG Chew Tab chewable tablet Chew 1 Tablet every day. 100 Tablet 3    hydrochlorothiazide (MICROZIDE) 12.5 MG capsule Take 1 Capsule by mouth 1 time a day as needed (swelling/edema). 30 Capsule 1    rosuvastatin (CRESTOR) 20 MG Tab Take 1 Tablet by mouth every day. 100 Tablet 3    traZODone (DESYREL) 50 MG Tab       levothyroxine (SYNTHROID) 125 MCG Tab       ALBUTEROL SULFATE INH Inhale as needed.      Celery Seed Oil Celery extract orally at night      coenzyme Q-10 30 MG capsule Take 60 mg by mouth every day.      diazePAM (VALIUM) 5 MG Tab Take 5 mg by mouth every 8 hours as needed.      lidocaine (LIDODERM) 5 % Patch as needed.      DULoxetine (CYMBALTA) 20 MG Cap DR Particles Take 60 mg by mouth at bedtime.      Cranberry 200 MG Cap Take  by mouth.      Magnesium 250 MG Tab Take 250 mg by mouth at bedtime.      [DISCONTINUED] metoprolol SR (TOPROL XL) 25 MG TABLET SR 24 HR Take 0.5 Tablets by mouth every day. 45 Tablet 3    [DISCONTINUED] aspirin (ASA) 81 MG Chew Tab chewable tablet Chew 81 mg every day.      [DISCONTINUED] clopidogrel (PLAVIX) 75 MG Tab Take 75 mg by mouth every day. (Patient not taking: Reported on 7/12/2023)       "[DISCONTINUED] celecoxib (CELEBREX) 200 MG Cap Take 1 Capsule by mouth 2 times a day. (Patient not taking: Reported on 7/12/2023) 30 Capsule 1     No facility-administered encounter medications on file as of 7/12/2023.     Review of Systems   Constitutional:  Negative for chills, fever and malaise/fatigue.   HENT:  Negative for congestion.    Respiratory:  Negative for cough and shortness of breath.    Cardiovascular:  Negative for chest pain, palpitations, orthopnea, leg swelling and PND.   Gastrointestinal:  Negative for abdominal pain and nausea.   Musculoskeletal:  Negative for myalgias.   Skin:  Negative for rash.   Neurological:  Negative for dizziness, loss of consciousness and headaches.   Endo/Heme/Allergies:  Does not bruise/bleed easily.   Psychiatric/Behavioral:  Positive for depression. The patient does not have insomnia.         Due to loss of  earlier this year.              Objective     /70 (BP Location: Left arm, Patient Position: Sitting, BP Cuff Size: Adult)   Pulse 82   Resp 12   Ht 1.676 m (5' 6\")   Wt 96.2 kg (212 lb)   SpO2 96%   BMI 34.22 kg/m²     Physical Exam  Constitutional:       Appearance: She is well-developed.   HENT:      Head: Normocephalic.   Neck:      Vascular: No JVD.   Cardiovascular:      Rate and Rhythm: Normal rate and regular rhythm.      Heart sounds: Normal heart sounds.   Pulmonary:      Effort: Pulmonary effort is normal. No respiratory distress.      Breath sounds: Normal breath sounds. No wheezing or rales.   Abdominal:      General: Bowel sounds are normal. There is no distension.      Palpations: Abdomen is soft.      Tenderness: There is no abdominal tenderness.   Musculoskeletal:         General: Normal range of motion.      Cervical back: Normal range of motion and neck supple.   Skin:     General: Skin is warm and dry.      Findings: No rash.   Neurological:      Mental Status: She is alert and oriented to person, place, and time. "   Psychiatric:         Mood and Affect: Mood normal.         Behavior: Behavior normal.      Comments: Still grieving her , but seems better today.       Narrative of MPI of 4/19/2023 ([facility]):  Low risk stress test   Mild fixed defect involving the inferior wall most likely consistent with   artifact   No significant reversibility consistent with ischemia   Normal left ventricular wall motion   Left ventricular ejection fraction calculated at 60%      Interpretation Summary of TTE of 1/23/2023:  There is mild-moderate concentric left ventricular hypertrophy. No regional wall motion   abnormalities noted. The Ejection Fraction estimate is 55-60%   The right ventricular systolic function is normal.   There is no pericardial effusion.      Procedures performed on 11/28/2022:  1. Ultrasound-guided vascular access of the right antecubital vein  2. Ultrasound-guided vascular access of the right common femoral vein  3. Ultrasound-guided vascular access of the right femoral artery  4. Angiography of the right common femoral artery   5. PTCA of the mid circumflex  6. PCI of the mid circumflex with the 2.75 x 24 mm synergy DAVID  7. PTCA of the distal RCA  8. PCI of the distal RCA with with a 2.75 x 24 mm synergy DAVID   9. Angiography of the right and left coronary arteries  10. Left heart catheterization    LABS AS OF 2/2/2023:  Cholesterol, S/P <=200 mg/dL 295 High     Triglycerides <=150 mg/dL 280 High     LDL Cholesterol <=99 mg/dL 232 High     HDL Cholesterol 40 - 59 mg/dL 31 Low     Cholesterol/HDL Ratio <=4.5 ratio 9.5 High         Assessment & Plan     1. Coronary artery disease involving native coronary artery of native heart without angina pectoris  clopidogrel (PLAVIX) 75 MG Tab    aspirin (ASA) 81 MG Chew Tab chewable tablet    Lipid Profile    Comp Metabolic Panel      2. Essential hypertension, benign  metoprolol SR (TOPROL XL) 25 MG TABLET SR 24 HR      3. Mixed hyperlipidemia        4. Other emphysema  (HCC)        5. Mantle cell lymphoma of lymph nodes of neck (HCC)        6. Obstructive sleep apnea            Medical Decision Making: Today's Assessment/Status/Plan:      1. CAD, status post PCI/DAVID to the LCx and RCA in November 2022, on ASA, Plavix, Metoprolol and Crestor. No angina or shortness of breath. MPI in April 2023 was normal.    2. Hypertension, treated with low dose Metoprolol and HCTZ, stable.    3. Hyperlipidemia, treated with Crestor 20mg. To repeat fasting labs; LDL goal is <70.    4. COPD, related to smoking; she is urged to quit completely.    5. Mantle cell lymphoma (diagnosed in 2020), followed by oncology.    6. JOLENE, treated/stable.    7. History of breast cancer, followed by oncology.    She remains stable at this time. Labs as above. Follow-up with me in 4 months, consider stopping Plavix at this time; sooner if clinical condition changes.

## 2023-07-25 DIAGNOSIS — E78.2 MIXED HYPERLIPIDEMIA: ICD-10-CM

## 2023-07-25 DIAGNOSIS — R60.0 LOCALIZED EDEMA: ICD-10-CM

## 2023-07-25 RX ORDER — ROSUVASTATIN CALCIUM 20 MG/1
20 TABLET, COATED ORAL DAILY
Qty: 100 TABLET | Refills: 3 | Status: SHIPPED | OUTPATIENT
Start: 2023-07-25

## 2023-07-25 RX ORDER — HYDROCHLOROTHIAZIDE 12.5 MG/1
12.5 CAPSULE, GELATIN COATED ORAL
Qty: 30 CAPSULE | Refills: 1 | Status: SHIPPED | OUTPATIENT
Start: 2023-07-25

## 2023-08-14 ENCOUNTER — TELEPHONE (OUTPATIENT)
Dept: CARDIOLOGY | Facility: MEDICAL CENTER | Age: 68
End: 2023-08-14
Payer: MEDICARE

## 2023-08-14 DIAGNOSIS — I25.10 CORONARY ARTERY DISEASE INVOLVING NATIVE CORONARY ARTERY OF NATIVE HEART WITHOUT ANGINA PECTORIS: ICD-10-CM

## 2023-08-14 DIAGNOSIS — I10 ESSENTIAL HYPERTENSION, BENIGN: ICD-10-CM

## 2023-08-14 RX ORDER — CLOPIDOGREL BISULFATE 75 MG/1
75 TABLET ORAL DAILY
Qty: 100 TABLET | Refills: 1 | Status: SHIPPED | OUTPATIENT
Start: 2023-08-14

## 2023-08-14 RX ORDER — CLOPIDOGREL BISULFATE 75 MG/1
75 TABLET ORAL DAILY
Qty: 100 TABLET | Refills: 1 | Status: SHIPPED
Start: 2023-08-14 | End: 2023-08-14

## 2023-08-14 RX ORDER — METOPROLOL SUCCINATE 25 MG/1
12.5 TABLET, EXTENDED RELEASE ORAL DAILY
Qty: 50 TABLET | Refills: 3 | Status: SHIPPED | OUTPATIENT
Start: 2023-08-14

## 2023-08-14 RX ORDER — METOPROLOL SUCCINATE 25 MG/1
12.5 TABLET, EXTENDED RELEASE ORAL DAILY
Qty: 50 TABLET | Refills: 3 | Status: SHIPPED
Start: 2023-08-14 | End: 2023-08-14

## 2023-08-14 NOTE — TELEPHONE ENCOUNTER
AB    Caller: Veronica Reeves     Medication Name and Dosage:  metoprolol SR (TOPROL XL) 25 MG TABLET SR 24 HR [[id number]]    clopidogrel (PLAVIX) 75 MG Tab [[id number]]    Medication amount left: 0    Preferred Pharmacy:    [place] [address]  PH:  [phone]    Other questions (Topic): Pt recently moved and is missing her medications. Is asking for a refill while she searches for them. Please update the pharmacy on file to [facility] in [place]    Callback Number PH:  [phone]    Thank you    Marisel NIELSEN

## 2023-11-06 ENCOUNTER — TELEPHONE (OUTPATIENT)
Dept: CARDIOLOGY | Facility: MEDICAL CENTER | Age: 68
End: 2023-11-06
Payer: MEDICARE

## 2023-11-15 ENCOUNTER — APPOINTMENT (OUTPATIENT)
Dept: CARDIOLOGY | Facility: PHYSICIAN GROUP | Age: 68
End: 2023-11-15
Payer: MEDICARE

## 2024-04-05 ENCOUNTER — TELEPHONE (OUTPATIENT)
Dept: CARDIOLOGY | Facility: MEDICAL CENTER | Age: 69
End: 2024-04-05
Payer: MEDICARE

## (undated) DEVICE — NEPTUNE 4 PORT MANIFOLD - (20/PK)

## (undated) DEVICE — MASK ANESTHESIA ADULT  - (100/CA)

## (undated) DEVICE — SUCTION INSTRUMENT YANKAUER BULBOUS TIP W/O VENT (50EA/CA)

## (undated) DEVICE — GLOVE BIOGEL SZ 8 SURGICAL PF LTX - (50PR/BX 4BX/CA)

## (undated) DEVICE — SENSOR SPO2 NEO LNCS ADHESIVE (20/BX) SEE USER NOTES

## (undated) DEVICE — GLOVE BIOGEL SZ 7.5 SURGICAL PF LTX - (50PR/BX 4BX/CA)

## (undated) DEVICE — TOWEL STOP TIMEOUT SAFETY FLAG (40EA/CA)

## (undated) DEVICE — SODIUM CHL IRRIGATION 0.9% 1000ML (12EA/CA)

## (undated) DEVICE — ELECTRODE DUAL RETURN W/ CORD - (50/PK)

## (undated) DEVICE — DRAPE STRLE REG TOWEL 18X24 - (10/BX 4BX/CA)"

## (undated) DEVICE — PROTECTOR ULNA NERVE - (36PR/CA)

## (undated) DEVICE — HEAD HOLDER JUNIOR/ADULT

## (undated) DEVICE — TOURNIQUET CUFF 24 X 4 ONE PORT - STERILE (10/BX)

## (undated) DEVICE — KIT ANESTHESIA W/CIRCUIT & 3/LT BAG W/FILTER (20EA/CA)

## (undated) DEVICE — TUBING CLEARLINK DUO-VENT - C-FLO (48EA/CA)

## (undated) DEVICE — GLOVE BIOGEL SZ 7 SURGICAL PF LTX - (50PR/BX 4BX/CA)

## (undated) DEVICE — PACK UPPER EXTREMITY (2EA/CA)

## (undated) DEVICE — ELECTRODE 850 FOAM ADHESIVE - HYDROGEL RADIOTRNSPRNT (50/PK)

## (undated) DEVICE — CANISTER SUCTION 3000ML MECHANICAL FILTER AUTO SHUTOFF MEDI-VAC NONSTERILE LF DISP  (40EA/CA)

## (undated) DEVICE — SET LEADWIRE 5 LEAD BEDSIDE DISPOSABLE ECG (1SET OF 5/EA)

## (undated) DEVICE — BANDAGE STERILE 2 IN X 75 IN (12EA/BX 8BX/CA)

## (undated) DEVICE — SET EXTENSION WITH 2 PORTS (48EA/CA) ***PART #2C8610 IS A SUBSTITUTE*****

## (undated) DEVICE — CHLORAPREP 26 ML APPLICATOR - ORANGE TINT(25/CA)

## (undated) DEVICE — GLOVE BIOGEL PI INDICATOR SZ 7.0 SURGICAL PF LF - (50/BX 4BX/CA)

## (undated) DEVICE — LACTATED RINGERS INJ 1000 ML - (14EA/CA 60CA/PF)

## (undated) DEVICE — SUTURE 4-0 ETHILON PS-2 18 BLACK (36PK/BX)

## (undated) DEVICE — SUTURE GENERAL